# Patient Record
Sex: MALE | Race: BLACK OR AFRICAN AMERICAN | Employment: FULL TIME | URBAN - METROPOLITAN AREA
[De-identification: names, ages, dates, MRNs, and addresses within clinical notes are randomized per-mention and may not be internally consistent; named-entity substitution may affect disease eponyms.]

---

## 2017-07-17 ENCOUNTER — TELEPHONE (OUTPATIENT)
Dept: INTERNAL MEDICINE CLINIC | Facility: CLINIC | Age: 52
End: 2017-07-17

## 2017-07-17 NOTE — TELEPHONE ENCOUNTER
Cramping began at 1:30 Am  Sick to stomach with some diharrea  Trying to pump fluids afraid to eat  Is there something he can do? ?

## 2017-07-17 NOTE — TELEPHONE ENCOUNTER
Pt c/o n/v and loose stools since 1:30am this morning. Pt states that vomit seemed like acid yellow/ orange but no blood.  Pt has not been feeling well so he took a nap and now has a h/a   Pt had fish for dinner, then later that night had a few deli turkey

## 2017-07-17 NOTE — TELEPHONE ENCOUNTER
Called pt to advise info per SD that sxs should be self limiting and he is to push fluids, take BRAT diet since this could be viral in nature. Pt is aware that if worsens or unresolved, needs to be seen, since no abd pain, does not need ov.     Pt had no f

## 2017-10-12 ENCOUNTER — OFFICE VISIT (OUTPATIENT)
Dept: INTERNAL MEDICINE CLINIC | Facility: CLINIC | Age: 52
End: 2017-10-12

## 2017-10-12 VITALS
BODY MASS INDEX: 25.15 KG/M2 | TEMPERATURE: 98 F | HEART RATE: 86 BPM | SYSTOLIC BLOOD PRESSURE: 124 MMHG | RESPIRATION RATE: 18 BRPM | DIASTOLIC BLOOD PRESSURE: 76 MMHG | HEIGHT: 74 IN | WEIGHT: 196 LBS | OXYGEN SATURATION: 98 %

## 2017-10-12 DIAGNOSIS — Z12.5 SCREENING PSA (PROSTATE SPECIFIC ANTIGEN): ICD-10-CM

## 2017-10-12 DIAGNOSIS — Z13.220 SCREENING CHOLESTEROL LEVEL: ICD-10-CM

## 2017-10-12 DIAGNOSIS — G89.29 CHRONIC LEFT SHOULDER PAIN: Primary | ICD-10-CM

## 2017-10-12 DIAGNOSIS — M25.512 CHRONIC LEFT SHOULDER PAIN: Primary | ICD-10-CM

## 2017-10-12 DIAGNOSIS — Z13.29 SCREENING FOR THYROID DISORDER: ICD-10-CM

## 2017-10-12 DIAGNOSIS — Z00.00 BLOOD TESTS FOR ROUTINE GENERAL PHYSICAL EXAMINATION: ICD-10-CM

## 2017-10-12 PROCEDURE — 99213 OFFICE O/P EST LOW 20 MIN: CPT | Performed by: INTERNAL MEDICINE

## 2017-10-12 PROCEDURE — 90686 IIV4 VACC NO PRSV 0.5 ML IM: CPT | Performed by: INTERNAL MEDICINE

## 2017-10-12 PROCEDURE — 90471 IMMUNIZATION ADMIN: CPT | Performed by: INTERNAL MEDICINE

## 2017-10-12 RX ORDER — NAPROXEN 500 MG/1
500 TABLET ORAL 2 TIMES DAILY WITH MEALS
Qty: 20 TABLET | Refills: 0 | Status: SHIPPED | OUTPATIENT
Start: 2017-10-12 | End: 2018-01-18 | Stop reason: ALTCHOICE

## 2017-10-12 NOTE — PROGRESS NOTES
Patient presents with:  Shoulder Pain: on left side has alot of discomfort since 2 months. HPI:  Here for left shoulder pain, mild for about 2 mos, worse with lifiting weight pull ups. No swelling redness or warmth. advil helps, but he never takes it. tender, no masses, no organomegaly or hernias.   Musculoskeletal: No edema, left shoulder with FROM, mild anterior deltoid tenderness on exam    A/P:    Chronic left shoulder pain  (primary encounter diagnosis)  Screening cholesterol level  Screening psa (p

## 2017-10-18 ENCOUNTER — LAB ENCOUNTER (OUTPATIENT)
Dept: LAB | Age: 52
End: 2017-10-18
Attending: INTERNAL MEDICINE
Payer: COMMERCIAL

## 2017-10-18 ENCOUNTER — HOSPITAL ENCOUNTER (OUTPATIENT)
Dept: GENERAL RADIOLOGY | Age: 52
Discharge: HOME | End: 2017-10-18
Attending: INTERNAL MEDICINE
Payer: COMMERCIAL

## 2017-10-18 DIAGNOSIS — M25.512 CHRONIC LEFT SHOULDER PAIN: ICD-10-CM

## 2017-10-18 DIAGNOSIS — Z13.220 SCREENING CHOLESTEROL LEVEL: ICD-10-CM

## 2017-10-18 DIAGNOSIS — Z12.5 SCREENING PSA (PROSTATE SPECIFIC ANTIGEN): ICD-10-CM

## 2017-10-18 DIAGNOSIS — Z13.29 SCREENING FOR THYROID DISORDER: ICD-10-CM

## 2017-10-18 DIAGNOSIS — Z00.00 BLOOD TESTS FOR ROUTINE GENERAL PHYSICAL EXAMINATION: ICD-10-CM

## 2017-10-18 DIAGNOSIS — G89.29 CHRONIC LEFT SHOULDER PAIN: ICD-10-CM

## 2017-10-18 PROCEDURE — 85025 COMPLETE CBC W/AUTO DIFF WBC: CPT

## 2017-10-18 PROCEDURE — 73030 X-RAY EXAM OF SHOULDER: CPT | Performed by: INTERNAL MEDICINE

## 2017-10-18 PROCEDURE — 80053 COMPREHEN METABOLIC PANEL: CPT

## 2017-10-18 PROCEDURE — 36415 COLL VENOUS BLD VENIPUNCTURE: CPT

## 2017-10-18 PROCEDURE — 84443 ASSAY THYROID STIM HORMONE: CPT

## 2017-10-18 PROCEDURE — 80061 LIPID PANEL: CPT

## 2018-01-18 ENCOUNTER — OFFICE VISIT (OUTPATIENT)
Dept: INTERNAL MEDICINE CLINIC | Facility: CLINIC | Age: 53
End: 2018-01-18

## 2018-01-18 VITALS
TEMPERATURE: 98 F | HEIGHT: 74 IN | OXYGEN SATURATION: 98 % | WEIGHT: 195 LBS | BODY MASS INDEX: 25.03 KG/M2 | RESPIRATION RATE: 16 BRPM | SYSTOLIC BLOOD PRESSURE: 118 MMHG | HEART RATE: 77 BPM | DIASTOLIC BLOOD PRESSURE: 76 MMHG

## 2018-01-18 DIAGNOSIS — H69.81 DYSFUNCTION OF RIGHT EUSTACHIAN TUBE: ICD-10-CM

## 2018-01-18 DIAGNOSIS — H65.91 FLUID LEVEL BEHIND TYMPANIC MEMBRANE OF RIGHT EAR: Primary | ICD-10-CM

## 2018-01-18 PROCEDURE — 99213 OFFICE O/P EST LOW 20 MIN: CPT | Performed by: PHYSICIAN ASSISTANT

## 2018-01-18 RX ORDER — FLUTICASONE PROPIONATE 50 MCG
2 SPRAY, SUSPENSION (ML) NASAL DAILY
Qty: 1 BOTTLE | Refills: 0 | Status: SHIPPED | OUTPATIENT
Start: 2018-01-18 | End: 2018-10-03

## 2018-01-18 NOTE — PROGRESS NOTES
Patient presents with:  Ear Drainage: rt ear, for 3 days, tried mucinex,nyquil for congestion      HPI:  Pt presents with c/o drainage in his R ear for 3 days.   Pt had a cold recently (head congestion, some post nasal drainage) but has recovered well with rubs or gallops. Pulmonary/Chest: Effort normal and breath sounds normal. No respiratory distress. No wheezes, rhonchi or rales  Skin: Skin is warm and dry. No rash noted. No erythema. No pallor.        A/P:    Fluid level behind tympanic membrane of righ

## 2018-03-09 ENCOUNTER — OFFICE VISIT (OUTPATIENT)
Dept: INTERNAL MEDICINE CLINIC | Facility: CLINIC | Age: 53
End: 2018-03-09

## 2018-03-09 VITALS
HEIGHT: 74 IN | BODY MASS INDEX: 25.67 KG/M2 | HEART RATE: 68 BPM | WEIGHT: 200 LBS | DIASTOLIC BLOOD PRESSURE: 68 MMHG | SYSTOLIC BLOOD PRESSURE: 114 MMHG | RESPIRATION RATE: 16 BRPM | TEMPERATURE: 98 F

## 2018-03-09 DIAGNOSIS — R21 RASH: ICD-10-CM

## 2018-03-09 DIAGNOSIS — D72.829 LEUKOCYTOSIS, UNSPECIFIED TYPE: ICD-10-CM

## 2018-03-09 DIAGNOSIS — Z00.00 PE (PHYSICAL EXAM), ANNUAL: Primary | ICD-10-CM

## 2018-03-09 PROCEDURE — 99396 PREV VISIT EST AGE 40-64: CPT | Performed by: INTERNAL MEDICINE

## 2018-03-09 RX ORDER — CLOTRIMAZOLE AND BETAMETHASONE DIPROPIONATE 10; .64 MG/G; MG/G
1 CREAM TOPICAL 2 TIMES DAILY PRN
Qty: 30 G | Refills: 0 | Status: SHIPPED | OUTPATIENT
Start: 2018-03-09 | End: 2018-10-03

## 2018-03-09 NOTE — PROGRESS NOTES
Patient presents with:  Physical: Room 8-  No main concerns from patient - pt is not fasting last labs completed in October 2017       HPI:  Here for cpe. Notes rash on right hand for 2 weeks, not itchy or painful.  Not changing, hypepigmented and some sc 8/23/2013     Past Surgical History:  3/22/16: COLONOSCOPY      Comment: Dr. Consuelo Germain repeat 3 yrs  No date: DENTAL SURGERY PROCEDURE      Comment: tooth implant  Family History   Problem Relation Age of Onset   • hyperlipidemia[other] Jayme Crass Mother Pulmonary/Chest: Effort normal and breath sounds normal. No respiratory distress. No wheezes, rhonchi or rales  Abdominal: Soft. Bowel sounds are normal. Non tender, no masses, no organomegaly or hernias. Musculoskeletal: Normal range of motion.  No norm

## 2018-07-13 ENCOUNTER — OFFICE VISIT (OUTPATIENT)
Dept: INTERNAL MEDICINE CLINIC | Facility: CLINIC | Age: 53
End: 2018-07-13

## 2018-07-13 VITALS
TEMPERATURE: 98 F | BODY MASS INDEX: 25.67 KG/M2 | HEIGHT: 74 IN | SYSTOLIC BLOOD PRESSURE: 120 MMHG | DIASTOLIC BLOOD PRESSURE: 76 MMHG | WEIGHT: 200 LBS | RESPIRATION RATE: 17 BRPM | HEART RATE: 72 BPM

## 2018-07-13 DIAGNOSIS — R36.9 PENILE DISCHARGE: ICD-10-CM

## 2018-07-13 DIAGNOSIS — R35.0 URINE FREQUENCY: Primary | ICD-10-CM

## 2018-07-13 LAB
APPEARANCE: CLEAR
MULTISTIX LOT#: NORMAL NUMERIC
PH, URINE: 7 (ref 4.5–8)
SPECIFIC GRAVITY: 1.01 (ref 1–1.03)
URINE-COLOR: YELLOW
UROBILINOGEN,SEMI-QN: 0.2 MG/DL (ref 0–1.9)

## 2018-07-13 PROCEDURE — 81003 URINALYSIS AUTO W/O SCOPE: CPT | Performed by: NURSE PRACTITIONER

## 2018-07-13 PROCEDURE — 99213 OFFICE O/P EST LOW 20 MIN: CPT | Performed by: NURSE PRACTITIONER

## 2018-07-13 PROCEDURE — 87086 URINE CULTURE/COLONY COUNT: CPT | Performed by: NURSE PRACTITIONER

## 2018-07-13 RX ORDER — FAMCICLOVIR 500 MG/1
1500 TABLET, FILM COATED ORAL ONCE
Qty: 3 TABLET | Refills: 0 | Status: SHIPPED | OUTPATIENT
Start: 2018-07-13 | End: 2018-09-13

## 2018-07-13 NOTE — PROGRESS NOTES
Patient presents with:  Discharge: Room 7 - Every so many years in the summertime has some discharge while coming back from a trip noticed shorts were \"wet\" turned out to be dishcarge (this past wednesday) has subsided since thursday - Frequent urinati Exam  /76 (BP Location: Right arm, Patient Position: Sitting, Cuff Size: adult)   Pulse 72   Temp 98 °F (36.7 °C) (Oral)   Resp 17   Ht 74\"   Wt 200 lb   BMI 25.68 kg/m²   Constitutional: well developed, well nourished,in no apparent distress  HEENT

## 2018-08-15 ENCOUNTER — LAB ENCOUNTER (OUTPATIENT)
Dept: LAB | Age: 53
End: 2018-08-15
Attending: INTERNAL MEDICINE
Payer: COMMERCIAL

## 2018-08-15 DIAGNOSIS — D72.829 LEUKOCYTOSIS, UNSPECIFIED TYPE: ICD-10-CM

## 2018-08-15 LAB
BASOPHILS # BLD AUTO: 0.03 X10(3) UL (ref 0–0.1)
BASOPHILS NFR BLD AUTO: 0.7 %
EOSINOPHIL # BLD AUTO: 0.06 X10(3) UL (ref 0–0.3)
EOSINOPHIL NFR BLD AUTO: 1.4 %
ERYTHROCYTE [DISTWIDTH] IN BLOOD BY AUTOMATED COUNT: 12.7 % (ref 11.5–16)
HCT VFR BLD AUTO: 48.3 % (ref 37–53)
HGB BLD-MCNC: 15.5 G/DL (ref 13–17)
IMMATURE GRANULOCYTE COUNT: 0.01 X10(3) UL (ref 0–1)
IMMATURE GRANULOCYTE RATIO %: 0.2 %
LYMPHOCYTES # BLD AUTO: 1.54 X10(3) UL (ref 0.9–4)
LYMPHOCYTES NFR BLD AUTO: 34.8 %
MCH RBC QN AUTO: 26.5 PG (ref 27–33.2)
MCHC RBC AUTO-ENTMCNC: 32.1 G/DL (ref 31–37)
MCV RBC AUTO: 82.7 FL (ref 80–99)
MONOCYTES # BLD AUTO: 0.38 X10(3) UL (ref 0.1–1)
MONOCYTES NFR BLD AUTO: 8.6 %
NEUTROPHIL ABS PRELIM: 2.41 X10 (3) UL (ref 1.3–6.7)
NEUTROPHILS # BLD AUTO: 2.41 X10(3) UL (ref 1.3–6.7)
NEUTROPHILS NFR BLD AUTO: 54.3 %
PLATELET # BLD AUTO: 259 10(3)UL (ref 150–450)
RBC # BLD AUTO: 5.84 X10(6)UL (ref 4.3–5.7)
RED CELL DISTRIBUTION WIDTH-SD: 38.2 FL (ref 35.1–46.3)
WBC # BLD AUTO: 4.4 X10(3) UL (ref 4–13)

## 2018-08-15 PROCEDURE — 36415 COLL VENOUS BLD VENIPUNCTURE: CPT | Performed by: INTERNAL MEDICINE

## 2018-08-15 PROCEDURE — 85025 COMPLETE CBC W/AUTO DIFF WBC: CPT | Performed by: INTERNAL MEDICINE

## 2018-09-13 RX ORDER — FAMCICLOVIR 500 MG/1
1500 TABLET, FILM COATED ORAL ONCE
Qty: 6 TABLET | Refills: 0 | Status: SHIPPED | OUTPATIENT
Start: 2018-09-13 | End: 2018-09-13

## 2018-09-13 NOTE — TELEPHONE ENCOUNTER
Re Famciclovir 500 MG Oral Tab. . Pt needs refill. States it was sent electronically and that it would not go through.

## 2018-10-03 ENCOUNTER — OFFICE VISIT (OUTPATIENT)
Dept: INTERNAL MEDICINE CLINIC | Facility: CLINIC | Age: 53
End: 2018-10-03
Payer: COMMERCIAL

## 2018-10-03 VITALS
HEART RATE: 76 BPM | DIASTOLIC BLOOD PRESSURE: 64 MMHG | RESPIRATION RATE: 16 BRPM | TEMPERATURE: 98 F | SYSTOLIC BLOOD PRESSURE: 122 MMHG | HEIGHT: 74 IN | BODY MASS INDEX: 25.8 KG/M2 | WEIGHT: 201 LBS

## 2018-10-03 DIAGNOSIS — Z23 NEEDS FLU SHOT: ICD-10-CM

## 2018-10-03 DIAGNOSIS — J02.9 SORE THROAT: Primary | ICD-10-CM

## 2018-10-03 DIAGNOSIS — J30.2 SEASONAL ALLERGIES: ICD-10-CM

## 2018-10-03 PROCEDURE — 99213 OFFICE O/P EST LOW 20 MIN: CPT | Performed by: PHYSICIAN ASSISTANT

## 2018-10-03 PROCEDURE — 90471 IMMUNIZATION ADMIN: CPT | Performed by: PHYSICIAN ASSISTANT

## 2018-10-03 PROCEDURE — 90686 IIV4 VACC NO PRSV 0.5 ML IM: CPT | Performed by: PHYSICIAN ASSISTANT

## 2018-10-03 RX ORDER — FAMCICLOVIR 500 MG/1
1500 TABLET, FILM COATED ORAL ONCE
Qty: 3 TABLET | Refills: 2 | Status: SHIPPED | OUTPATIENT
Start: 2018-10-03 | End: 2020-01-28

## 2018-10-03 RX ORDER — ALCLOMETASONE DIPROPIONATE 0.5 MG/G
OINTMENT TOPICAL
COMMUNITY
Start: 2018-06-18 | End: 2019-11-18 | Stop reason: ALTCHOICE

## 2018-10-03 RX ORDER — FLUTICASONE PROPIONATE 50 MCG
2 SPRAY, SUSPENSION (ML) NASAL DAILY
Qty: 1 BOTTLE | Refills: 3 | Status: SHIPPED | OUTPATIENT
Start: 2018-10-03 | End: 2019-11-12

## 2018-10-03 NOTE — PROGRESS NOTES
Patient presents with:  Sore Throat: cn room 6: sore throat when sneezing for the last couple weeks . HPI:  Pt presents with c/o L sided sore throat with sneezing over the last couple of weeks.   Admits it did start to improve over the last couple of Exam  /64 (BP Location: Right arm, Patient Position: Sitting, Cuff Size: adult)   Pulse 76   Temp 98.3 °F (36.8 °C) (Oral)   Resp 16   Ht 74\"   Wt 201 lb   BMI 25.81 kg/m²   Constitutional:  No distress. HEENT:  Normocephalic and atraumatic.  Joyce Jing

## 2018-10-08 ENCOUNTER — TELEPHONE (OUTPATIENT)
Dept: INTERNAL MEDICINE CLINIC | Facility: CLINIC | Age: 53
End: 2018-10-08

## 2018-10-08 NOTE — TELEPHONE ENCOUNTER
Pt was seen on 10/3/18 by CB for a sore throat is still pretty painful. When he sneezes and cough and turned his head (moves his neck) it hurts.  Please advise

## 2018-10-08 NOTE — TELEPHONE ENCOUNTER
Called pt to inform sore throat pain has increased when sneezing, cough, and with head movements. Claritin and Flonase- minimal improvements. Per CB, F/U if not resolving- pt does not feel necessary to RTC at this time.  Advised may try in the mean trial lo

## 2019-02-12 ENCOUNTER — TELEPHONE (OUTPATIENT)
Dept: INTERNAL MEDICINE CLINIC | Facility: CLINIC | Age: 54
End: 2019-02-12

## 2019-02-12 DIAGNOSIS — Z12.5 SPECIAL SCREENING FOR MALIGNANT NEOPLASM OF PROSTATE: ICD-10-CM

## 2019-02-12 DIAGNOSIS — Z00.00 ROUTINE GENERAL MEDICAL EXAMINATION AT A HEALTH CARE FACILITY: Primary | ICD-10-CM

## 2019-02-12 NOTE — TELEPHONE ENCOUNTER
CPE   Future Appointments   Date Time Provider Whitley Perez       [facility]   3/18/2019  8:00 AM Brandon Veras MD [facility]     Orders to [address] must fast no call back required

## 2019-03-05 ENCOUNTER — OFFICE VISIT (OUTPATIENT)
Dept: INTERNAL MEDICINE CLINIC | Facility: CLINIC | Age: 54
End: 2019-03-05
Payer: COMMERCIAL

## 2019-03-05 VITALS
DIASTOLIC BLOOD PRESSURE: 60 MMHG | HEIGHT: 74 IN | BODY MASS INDEX: 25.8 KG/M2 | SYSTOLIC BLOOD PRESSURE: 108 MMHG | WEIGHT: 201 LBS | TEMPERATURE: 98 F | HEART RATE: 68 BPM

## 2019-03-05 DIAGNOSIS — G89.29 CHRONIC PAIN OF BOTH KNEES: ICD-10-CM

## 2019-03-05 DIAGNOSIS — M25.562 CHRONIC PAIN OF BOTH KNEES: ICD-10-CM

## 2019-03-05 DIAGNOSIS — M25.561 CHRONIC PAIN OF BOTH KNEES: ICD-10-CM

## 2019-03-05 DIAGNOSIS — R21 RASH: Primary | ICD-10-CM

## 2019-03-05 PROCEDURE — 99213 OFFICE O/P EST LOW 20 MIN: CPT | Performed by: INTERNAL MEDICINE

## 2019-03-05 RX ORDER — ACYCLOVIR 50 MG/G
1 OINTMENT TOPICAL
Qty: 15 G | Refills: 0 | Status: SHIPPED | OUTPATIENT
Start: 2019-03-05 | End: 2020-10-09

## 2019-03-05 RX ORDER — CLOTRIMAZOLE AND BETAMETHASONE DIPROPIONATE 10; .64 MG/G; MG/G
1 CREAM TOPICAL 2 TIMES DAILY PRN
Qty: 15 G | Refills: 0 | Status: SHIPPED | OUTPATIENT
Start: 2019-03-05 | End: 2019-11-18 | Stop reason: ALTCHOICE

## 2019-03-05 RX ORDER — ACYCLOVIR 50 MG/G
1 OINTMENT TOPICAL
COMMUNITY
End: 2019-03-05

## 2019-03-05 NOTE — PROGRESS NOTES
Patient presents with:  Knee Pain: LG. Room 9. Bilateral knee pain for about 1-2 months.        HPI:  Here for bilateal knee pain for 1-3 mos, cinse starring to play tennis again, some stiffness after sitting, denies instability, riht worse than left, moder intact distal pulses. No murmur, rubs or gallops. Pulmonary/Chest: Effort normal and breath sounds normal. No respiratory distress. Abdominal: Soft. Bowel sounds are normal. Non tender, no masses, no organomegaly or hernias.   Musculoskeletal: No edema;

## 2019-03-12 ENCOUNTER — LAB ENCOUNTER (OUTPATIENT)
Dept: LAB | Age: 54
End: 2019-03-12
Attending: INTERNAL MEDICINE
Payer: COMMERCIAL

## 2019-03-12 DIAGNOSIS — Z12.5 SPECIAL SCREENING FOR MALIGNANT NEOPLASM OF PROSTATE: ICD-10-CM

## 2019-03-12 DIAGNOSIS — Z00.00 ROUTINE GENERAL MEDICAL EXAMINATION AT A HEALTH CARE FACILITY: ICD-10-CM

## 2019-03-12 LAB
ALBUMIN SERPL-MCNC: 4.1 G/DL (ref 3.4–5)
ALBUMIN/GLOB SERPL: 1.2 {RATIO} (ref 1–2)
ALP LIVER SERPL-CCNC: 66 U/L (ref 45–117)
ALT SERPL-CCNC: 26 U/L (ref 16–61)
ANION GAP SERPL CALC-SCNC: 5 MMOL/L (ref 0–18)
AST SERPL-CCNC: 28 U/L (ref 15–37)
BASOPHILS # BLD AUTO: 0.04 X10(3) UL (ref 0–0.2)
BASOPHILS NFR BLD AUTO: 0.8 %
BILIRUB SERPL-MCNC: 0.6 MG/DL (ref 0.1–2)
BUN BLD-MCNC: 19 MG/DL (ref 7–18)
BUN/CREAT SERPL: 15.1 (ref 10–20)
CALCIUM BLD-MCNC: 9.1 MG/DL (ref 8.5–10.1)
CHLORIDE SERPL-SCNC: 107 MMOL/L (ref 98–107)
CHOLEST SMN-MCNC: 192 MG/DL (ref ?–200)
CO2 SERPL-SCNC: 28 MMOL/L (ref 21–32)
COMPLEXED PSA SERPL-MCNC: 5.36 NG/ML (ref ?–4)
CREAT BLD-MCNC: 1.26 MG/DL (ref 0.7–1.3)
DEPRECATED RDW RBC AUTO: 37.7 FL (ref 35.1–46.3)
EOSINOPHIL # BLD AUTO: 0.08 X10(3) UL (ref 0–0.7)
EOSINOPHIL NFR BLD AUTO: 1.7 %
ERYTHROCYTE [DISTWIDTH] IN BLOOD BY AUTOMATED COUNT: 12.8 % (ref 11–15)
GLOBULIN PLAS-MCNC: 3.3 G/DL (ref 2.8–4.4)
GLUCOSE BLD-MCNC: 96 MG/DL (ref 70–99)
HCT VFR BLD AUTO: 46.4 % (ref 39–53)
HDLC SERPL-MCNC: 53 MG/DL (ref 40–59)
HGB BLD-MCNC: 15.2 G/DL (ref 13–17.5)
IMM GRANULOCYTES # BLD AUTO: 0.01 X10(3) UL (ref 0–1)
IMM GRANULOCYTES NFR BLD: 0.2 %
LDLC SERPL CALC-MCNC: 121 MG/DL (ref ?–100)
LYMPHOCYTES # BLD AUTO: 1.76 X10(3) UL (ref 1–4)
LYMPHOCYTES NFR BLD AUTO: 36.4 %
M PROTEIN MFR SERPL ELPH: 7.4 G/DL (ref 6.4–8.2)
MCH RBC QN AUTO: 26.8 PG (ref 26–34)
MCHC RBC AUTO-ENTMCNC: 32.8 G/DL (ref 31–37)
MCV RBC AUTO: 81.8 FL (ref 80–100)
MONOCYTES # BLD AUTO: 0.39 X10(3) UL (ref 0.1–1)
MONOCYTES NFR BLD AUTO: 8.1 %
NEUTROPHILS # BLD AUTO: 2.55 X10 (3) UL (ref 1.5–7.7)
NEUTROPHILS # BLD AUTO: 2.55 X10(3) UL (ref 1.5–7.7)
NEUTROPHILS NFR BLD AUTO: 52.8 %
NONHDLC SERPL-MCNC: 139 MG/DL (ref ?–130)
OSMOLALITY SERPL CALC.SUM OF ELEC: 292 MOSM/KG (ref 275–295)
PLATELET # BLD AUTO: 252 10(3)UL (ref 150–450)
POTASSIUM SERPL-SCNC: 4.2 MMOL/L (ref 3.5–5.1)
RBC # BLD AUTO: 5.67 X10(6)UL (ref 4.3–5.7)
SODIUM SERPL-SCNC: 140 MMOL/L (ref 136–145)
TRIGL SERPL-MCNC: 88 MG/DL (ref 30–149)
TSI SER-ACNC: 2.17 MIU/ML (ref 0.36–3.74)
VLDLC SERPL CALC-MCNC: 18 MG/DL (ref 0–30)
WBC # BLD AUTO: 4.8 X10(3) UL (ref 4–11)

## 2019-03-12 PROCEDURE — 85025 COMPLETE CBC W/AUTO DIFF WBC: CPT

## 2019-03-12 PROCEDURE — 80061 LIPID PANEL: CPT

## 2019-03-12 PROCEDURE — 36415 COLL VENOUS BLD VENIPUNCTURE: CPT

## 2019-03-12 PROCEDURE — 80053 COMPREHEN METABOLIC PANEL: CPT

## 2019-03-12 PROCEDURE — 84443 ASSAY THYROID STIM HORMONE: CPT

## 2019-03-15 ENCOUNTER — HOSPITAL ENCOUNTER (OUTPATIENT)
Dept: GENERAL RADIOLOGY | Age: 54
Discharge: HOME | End: 2019-03-15
Attending: INTERNAL MEDICINE
Payer: COMMERCIAL

## 2019-03-15 DIAGNOSIS — M25.562 CHRONIC PAIN OF BOTH KNEES: ICD-10-CM

## 2019-03-15 DIAGNOSIS — M25.561 CHRONIC PAIN OF BOTH KNEES: ICD-10-CM

## 2019-03-15 DIAGNOSIS — G89.29 CHRONIC PAIN OF BOTH KNEES: ICD-10-CM

## 2019-03-15 PROCEDURE — 73560 X-RAY EXAM OF KNEE 1 OR 2: CPT | Performed by: INTERNAL MEDICINE

## 2019-03-18 ENCOUNTER — OFFICE VISIT (OUTPATIENT)
Dept: INTERNAL MEDICINE CLINIC | Facility: CLINIC | Age: 54
End: 2019-03-18
Payer: COMMERCIAL

## 2019-03-18 VITALS
DIASTOLIC BLOOD PRESSURE: 80 MMHG | WEIGHT: 205.81 LBS | HEART RATE: 84 BPM | HEIGHT: 74 IN | SYSTOLIC BLOOD PRESSURE: 130 MMHG | RESPIRATION RATE: 16 BRPM | BODY MASS INDEX: 26.41 KG/M2

## 2019-03-18 DIAGNOSIS — N40.0 BENIGN PROSTATIC HYPERPLASIA WITHOUT LOWER URINARY TRACT SYMPTOMS: ICD-10-CM

## 2019-03-18 DIAGNOSIS — Z12.11 SCREEN FOR COLON CANCER: ICD-10-CM

## 2019-03-18 DIAGNOSIS — R97.20 ELEVATED PSA: ICD-10-CM

## 2019-03-18 DIAGNOSIS — Z00.00 PE (PHYSICAL EXAM), ANNUAL: Primary | ICD-10-CM

## 2019-03-18 PROBLEM — M17.10 ARTHRITIS OF KNEE: Status: ACTIVE | Noted: 2019-03-18

## 2019-03-18 PROCEDURE — 99396 PREV VISIT EST AGE 40-64: CPT | Performed by: INTERNAL MEDICINE

## 2019-03-18 NOTE — PROGRESS NOTES
Patient presents with:  Physical: RB exam rm-9. Patient is here for physical exam.       HPI:  Here for cpe. Has psa eleation, deneis bph symptoms. Notes some arthritis pain of knees bilaterally seen on xray as well. It is mild.      Review of Systems   Con (recurrent)    • URI (upper respiratory infection) 8/23/2013     Past Surgical History:   Procedure Laterality Date   • COLONOSCOPY  3/22/16    Dr. Bean Maya repeat 3 yrs   • DENTAL SURGERY PROCEDURE      tooth implant     Family History   Problem Relati Eyes: Conjunctivae and EOM are normal. PERRLA. No scleral icterus. Neck: Normal range of motion. Neck supple. Normal carotid pulses and no JVD present. No edema present. No mass and no thyromegaly present.    Cardiovascular: Normal rate, regular rhythm

## 2019-04-08 ENCOUNTER — APPOINTMENT (OUTPATIENT)
Dept: LAB | Age: 54
End: 2019-04-08
Attending: INTERNAL MEDICINE
Payer: COMMERCIAL

## 2019-04-08 DIAGNOSIS — R97.20 ELEVATED PSA: ICD-10-CM

## 2019-04-08 PROCEDURE — 36415 COLL VENOUS BLD VENIPUNCTURE: CPT

## 2019-04-08 PROCEDURE — 84153 ASSAY OF PSA TOTAL: CPT

## 2019-05-02 ENCOUNTER — OFFICE VISIT (OUTPATIENT)
Dept: INTERNAL MEDICINE CLINIC | Facility: CLINIC | Age: 54
End: 2019-05-02
Payer: COMMERCIAL

## 2019-05-02 VITALS
HEART RATE: 72 BPM | SYSTOLIC BLOOD PRESSURE: 124 MMHG | HEIGHT: 74 IN | RESPIRATION RATE: 16 BRPM | WEIGHT: 205 LBS | BODY MASS INDEX: 26.31 KG/M2 | DIASTOLIC BLOOD PRESSURE: 80 MMHG

## 2019-05-02 DIAGNOSIS — S99.911A INJURY OF RIGHT ANKLE, INITIAL ENCOUNTER: Primary | ICD-10-CM

## 2019-05-02 DIAGNOSIS — R97.20 ELEVATED PSA: ICD-10-CM

## 2019-05-02 DIAGNOSIS — N40.0 BENIGN PROSTATIC HYPERPLASIA WITHOUT LOWER URINARY TRACT SYMPTOMS: ICD-10-CM

## 2019-05-02 PROCEDURE — 99214 OFFICE O/P EST MOD 30 MIN: CPT | Performed by: INTERNAL MEDICINE

## 2019-05-02 NOTE — PROGRESS NOTES
Armond Castellanos  9/17/1965    Patient presents with: Ankle Pain: Pt has R ankle swelling. Pt states 5-6 weeks ago he injured is R ankle. LB-rm 7      SUBJECTIVE   Armond Castellanos is a 48year old male who presents with right ankle pain.     The patient notes a • Personal history of pneumonia (recurrent)    • URI (upper respiratory infection) 8/23/2013      Patient Active Problem List:     Herpes simplex without mention of complication     Unspecified vitamin D deficiency     Laboratory examination ordered as p right ankle pain.     Right ankle sprain:  Resolved  Asymptomatic mild lateral swelling as compared with left ankle; unsure of chronicity, with no other abnormalities on examination  Continue weight bearing, and routine and recreational physical activity  U

## 2019-05-08 ENCOUNTER — TELEPHONE (OUTPATIENT)
Dept: INTERNAL MEDICINE CLINIC | Facility: CLINIC | Age: 54
End: 2019-05-08

## 2019-05-08 NOTE — TELEPHONE ENCOUNTER
Pt calling to say he got a message via My Chart saying that there was \"bloodwork and stool tests\" that needed to be completed. Pt unsure what they are for. OK to answer through My Chart message.

## 2019-05-08 NOTE — TELEPHONE ENCOUNTER
Last labs 3/12/19  Any additional labs needed? Due for FIT colorectal screening- will mail kit to pt's home address. Please advise. Thank you.

## 2019-05-09 ENCOUNTER — OFFICE VISIT (OUTPATIENT)
Dept: SURGERY | Facility: CLINIC | Age: 54
End: 2019-05-09
Payer: COMMERCIAL

## 2019-05-09 VITALS
WEIGHT: 197 LBS | HEART RATE: 76 BPM | DIASTOLIC BLOOD PRESSURE: 80 MMHG | HEIGHT: 74 IN | TEMPERATURE: 98 F | SYSTOLIC BLOOD PRESSURE: 130 MMHG | BODY MASS INDEX: 25.28 KG/M2

## 2019-05-09 DIAGNOSIS — Z12.5 SPECIAL SCREENING FOR MALIGNANT NEOPLASM OF PROSTATE: ICD-10-CM

## 2019-05-09 DIAGNOSIS — N40.0 BENIGN PROSTATIC HYPERPLASIA WITHOUT LOWER URINARY TRACT SYMPTOMS: Primary | ICD-10-CM

## 2019-05-09 DIAGNOSIS — R97.20 ELEVATED PSA: ICD-10-CM

## 2019-05-09 PROCEDURE — 99244 OFF/OP CNSLTJ NEW/EST MOD 40: CPT | Performed by: UROLOGY

## 2019-05-09 NOTE — PROGRESS NOTES
[facility]  Initial Office Consultation    HPI:   Sherrie Rosales is a 48year old male here today for consultation at the request of, and a copy of this note will be sent to, Mark Duggan MD.    1. Elevated Screening PSA  Patient is - Other (hyperlipidemia) Mother    • Hypertension Father    • Other (HTN) Father      Allergies: Ciprofloxacin Hcl    REVIEW OF SYSTEMS:  Review of Systems   Constitutional: Negative for appetite change, chills, fever and unexpected weight change.    HENT: Ne 2.47    3/12/2015  2.52    9/6/2013 1.7     3/12/2012 1.0     1/25/2011 1.2       IMAGING:  No results found.     IMPRESSION:  51-year-old -American male with an elevated screening PSA level in 03/2019 which went back down to normal upon repeat in 04

## 2019-10-31 ENCOUNTER — MED REC SCAN ONLY (OUTPATIENT)
Dept: INTERNAL MEDICINE CLINIC | Facility: CLINIC | Age: 54
End: 2019-10-31

## 2019-10-31 ENCOUNTER — IMMUNIZATION (OUTPATIENT)
Dept: INTERNAL MEDICINE CLINIC | Facility: CLINIC | Age: 54
End: 2019-10-31
Payer: COMMERCIAL

## 2019-10-31 DIAGNOSIS — Z23 NEED FOR VACCINATION: ICD-10-CM

## 2019-10-31 PROCEDURE — 90686 IIV4 VACC NO PRSV 0.5 ML IM: CPT | Performed by: INTERNAL MEDICINE

## 2019-10-31 PROCEDURE — 90471 IMMUNIZATION ADMIN: CPT | Performed by: INTERNAL MEDICINE

## 2019-11-12 RX ORDER — FLUTICASONE PROPIONATE 50 MCG
SPRAY, SUSPENSION (ML) NASAL
Qty: 16 G | Refills: 2 | Status: SHIPPED | OUTPATIENT
Start: 2019-11-12 | End: 2021-04-29

## 2019-11-18 ENCOUNTER — OFFICE VISIT (OUTPATIENT)
Dept: INTERNAL MEDICINE CLINIC | Facility: CLINIC | Age: 54
End: 2019-11-18
Payer: COMMERCIAL

## 2019-11-18 VITALS
HEIGHT: 74 IN | DIASTOLIC BLOOD PRESSURE: 82 MMHG | HEART RATE: 72 BPM | WEIGHT: 202 LBS | SYSTOLIC BLOOD PRESSURE: 118 MMHG | RESPIRATION RATE: 16 BRPM | BODY MASS INDEX: 25.93 KG/M2 | TEMPERATURE: 98 F

## 2019-11-18 DIAGNOSIS — M62.838 CERVICAL PARASPINAL MUSCLE SPASM: Primary | ICD-10-CM

## 2019-11-18 PROCEDURE — 99213 OFFICE O/P EST LOW 20 MIN: CPT | Performed by: INTERNAL MEDICINE

## 2019-11-18 RX ORDER — CYCLOBENZAPRINE HCL 10 MG
10 TABLET ORAL NIGHTLY PRN
Qty: 14 TABLET | Refills: 1 | Status: SHIPPED | OUTPATIENT
Start: 2019-11-18 | End: 2019-12-02

## 2019-11-18 NOTE — PROGRESS NOTES
Herminio Vences  9/17/1965    Patient presents with:  Neck Pain: x 1 wk, L side, \"aching pain\", trouble turning head, using OTC advil w/o relief      SUBJECTIVE   Herminio Vences is a 47year old male who presents with neck pain and stiffness.     The patient routine general medical examination     Special screening for malignant neoplasm of prostate     Seasonal allergies     Pain in joint, lower leg     URI (upper respiratory infection)     Hyperplastic colonic polyp     Arthritis of knee     Elevated PSA spasm:  Advised stretching exercises and application of heat  Oral muscle relaxant therapy prescribed  If symptoms persist will consider imaging vs physical therapy. The patient indicates understanding of these issues and agrees to the plan.     TODAY'S

## 2019-11-27 PROBLEM — Z86.010 PERSONAL HISTORY OF COLONIC POLYPS: Status: ACTIVE | Noted: 2019-11-27

## 2019-11-27 PROBLEM — Z86.0100 PERSONAL HISTORY OF COLONIC POLYPS: Status: ACTIVE | Noted: 2019-11-27

## 2020-01-28 RX ORDER — FAMCICLOVIR 500 MG/1
1500 TABLET, FILM COATED ORAL ONCE
Qty: 3 TABLET | Refills: 0 | Status: SHIPPED | OUTPATIENT
Start: 2020-01-28 | End: 2020-03-06

## 2020-01-28 NOTE — TELEPHONE ENCOUNTER
Prescription Refill Request - Patient advised can take 48-72 hours.     Name of Medication (strength, dose, qty requested:   Famciclovir 500 MG Oral Tab () 6 tablet 0 2018   Sig:   Take 3 tablets (1,500 mg total) by mouth one time for

## 2020-01-28 NOTE — TELEPHONE ENCOUNTER
Please see previous TE      LOV:11/18/19 AD  FOV:none on file   LAST RX:10/3/18 500 mg take 3 tabs by mouth for 1 time dose 3 tabs 2 refills   LAST LABS:4/8/19 psa  PER PROTOCOL: to provider

## 2020-03-06 RX ORDER — FAMCICLOVIR 500 MG/1
1500 TABLET, FILM COATED ORAL ONCE
Qty: 3 TABLET | Refills: 0 | Status: SHIPPED | OUTPATIENT
Start: 2020-03-06 | End: 2020-06-02

## 2020-06-02 RX ORDER — FAMCICLOVIR 500 MG/1
1500 TABLET, FILM COATED ORAL ONCE
Qty: 3 TABLET | Refills: 0 | Status: SHIPPED | OUTPATIENT
Start: 2020-06-02 | End: 2020-06-02

## 2020-06-02 NOTE — TELEPHONE ENCOUNTER
Pt would like new rx for FAMCICLOVIR 500 MG Oral Tab and acyclovir 5 % External Ointment sent [facility] for his cold sores he is getting

## 2020-06-02 NOTE — TELEPHONE ENCOUNTER
Pt requesting Famciclovir 500mg    LOV:11/18/19-Cervical Paraspinal Muscle Spasm with AD  Last CPE:3/18/19 CPE with AS  Last refill: Famciclovir 500mg-3/6/20  Quantity:3 tablets, 0 refills  Last labs that are related: cbc 3/12/19  Future appointment: None

## 2020-10-09 ENCOUNTER — OFFICE VISIT (OUTPATIENT)
Dept: INTERNAL MEDICINE CLINIC | Facility: CLINIC | Age: 55
End: 2020-10-09
Payer: COMMERCIAL

## 2020-10-09 VITALS
HEIGHT: 74 IN | RESPIRATION RATE: 18 BRPM | BODY MASS INDEX: 25.95 KG/M2 | HEART RATE: 68 BPM | TEMPERATURE: 97 F | SYSTOLIC BLOOD PRESSURE: 124 MMHG | WEIGHT: 202.19 LBS | DIASTOLIC BLOOD PRESSURE: 82 MMHG

## 2020-10-09 DIAGNOSIS — E55.9 VITAMIN D DEFICIENCY: ICD-10-CM

## 2020-10-09 DIAGNOSIS — Z12.5 SCREENING PSA (PROSTATE SPECIFIC ANTIGEN): ICD-10-CM

## 2020-10-09 DIAGNOSIS — Z00.00 PE (PHYSICAL EXAM), ANNUAL: Primary | ICD-10-CM

## 2020-10-09 DIAGNOSIS — Z00.00 ROUTINE GENERAL MEDICAL EXAMINATION AT A HEALTH CARE FACILITY: ICD-10-CM

## 2020-10-09 DIAGNOSIS — N40.0 BENIGN PROSTATIC HYPERPLASIA WITHOUT LOWER URINARY TRACT SYMPTOMS: ICD-10-CM

## 2020-10-09 DIAGNOSIS — Z00.00 LABORATORY EXAMINATION ORDERED AS PART OF A ROUTINE GENERAL MEDICAL EXAMINATION: ICD-10-CM

## 2020-10-09 DIAGNOSIS — Z13.220 SCREENING CHOLESTEROL LEVEL: ICD-10-CM

## 2020-10-09 PROCEDURE — 3079F DIAST BP 80-89 MM HG: CPT | Performed by: INTERNAL MEDICINE

## 2020-10-09 PROCEDURE — 90471 IMMUNIZATION ADMIN: CPT | Performed by: INTERNAL MEDICINE

## 2020-10-09 PROCEDURE — 90686 IIV4 VACC NO PRSV 0.5 ML IM: CPT | Performed by: INTERNAL MEDICINE

## 2020-10-09 PROCEDURE — 3074F SYST BP LT 130 MM HG: CPT | Performed by: INTERNAL MEDICINE

## 2020-10-09 PROCEDURE — 3008F BODY MASS INDEX DOCD: CPT | Performed by: INTERNAL MEDICINE

## 2020-10-09 PROCEDURE — 99396 PREV VISIT EST AGE 40-64: CPT | Performed by: INTERNAL MEDICINE

## 2020-10-09 RX ORDER — ACYCLOVIR 50 MG/G
1 OINTMENT TOPICAL
Qty: 15 G | Refills: 3 | Status: SHIPPED | OUTPATIENT
Start: 2020-10-09

## 2020-10-09 RX ORDER — LORATADINE 10 MG/1
10 TABLET ORAL DAILY
COMMUNITY

## 2020-10-09 NOTE — PROGRESS NOTES
Patient presents with:  Wellness Visit: MR rm 8 annual pe   Vaccinations: wants flu       HPI:  Here for cpe. Review of Systems   Constitutional: Negative for fever, chills and fatigue. No distress.   HENT: Negative for hearing loss, congestion, sore th infection) 8/23/2013     Past Surgical History:   Procedure Laterality Date   • COLONOSCOPY  3/22/16    Dr. Anne Canas repeat 3 yrs   • DENTAL SURGERY PROCEDURE      tooth implant     Family History   Problem Relation Age of Onset   • Hypertension Mother Left arm, Patient Position: Sitting, Cuff Size: adult)   Pulse 68   Temp 97.2 °F (36.2 °C) (Temporal)   Resp 18   Ht 74\"   Wt 202 lb 3.2 oz (91.7 kg)   BMI 25.96 kg/m²   Constitutional: Oriented to person, place, and time. No distress.    HEENT:  Normoceph this Visit:  Requested Prescriptions     Signed Prescriptions Disp Refills   • acyclovir 5 % External Ointment 15 g 3     Sig: Apply 1 Application topically every 3 (three) hours.        Imaging & Consults:  FLULAVAL INFLUENZA VACCINE QUAD PRESERVATIVE FREE

## 2020-10-12 ENCOUNTER — LAB ENCOUNTER (OUTPATIENT)
Dept: LAB | Age: 55
End: 2020-10-12
Attending: INTERNAL MEDICINE
Payer: COMMERCIAL

## 2020-10-12 DIAGNOSIS — Z12.5 SCREENING PSA (PROSTATE SPECIFIC ANTIGEN): ICD-10-CM

## 2020-10-12 DIAGNOSIS — Z13.220 SCREENING CHOLESTEROL LEVEL: ICD-10-CM

## 2020-10-12 DIAGNOSIS — Z00.00 LABORATORY EXAMINATION ORDERED AS PART OF A ROUTINE GENERAL MEDICAL EXAMINATION: ICD-10-CM

## 2020-10-12 DIAGNOSIS — E55.9 VITAMIN D DEFICIENCY: ICD-10-CM

## 2020-10-12 PROCEDURE — 80050 GENERAL HEALTH PANEL: CPT | Performed by: INTERNAL MEDICINE

## 2020-10-12 PROCEDURE — 84153 ASSAY OF PSA TOTAL: CPT | Performed by: INTERNAL MEDICINE

## 2020-10-12 PROCEDURE — 82306 VITAMIN D 25 HYDROXY: CPT | Performed by: INTERNAL MEDICINE

## 2020-10-12 PROCEDURE — 36415 COLL VENOUS BLD VENIPUNCTURE: CPT | Performed by: INTERNAL MEDICINE

## 2020-10-12 PROCEDURE — 80061 LIPID PANEL: CPT | Performed by: INTERNAL MEDICINE

## 2020-10-28 NOTE — H&P
HPI:     Sowmya Walters is a 54year old AA male with a PMH of allergies, OA, herpes, BPH/LUTS. He presents as a consult from Dr Austen Torres office with elevated PSA and BPH/LUTS.     Saw Dr Brenden Botello last year when PSA manuel to 5.36 but decreased to 3.51 t patient today with > 50% of time spent counseling.     HISTORY:  Past Medical History:   Diagnosis Date   • Acute sinusitis, unspecified    • Arthritis Jan 2018    Mainly knees   • Bronchitis, not specified as acute or chronic    • Cough    • Dermatophytosi APPEARANCE: well, developed, well nourished, in no acute distress  NEUROLOGIC: nonfocal, alert and oriented  HEAD: normocephalic, atraumatic  EYES: sclera non-icteric  EARS: hearing intact  ORAL CAVITY: mucosa moist  NECK/THYROID: no obvious goiter or mass

## 2020-11-04 ENCOUNTER — OFFICE VISIT (OUTPATIENT)
Dept: SURGERY | Facility: CLINIC | Age: 55
End: 2020-11-04
Payer: COMMERCIAL

## 2020-11-04 VITALS — TEMPERATURE: 97 F | HEART RATE: 84 BPM | SYSTOLIC BLOOD PRESSURE: 145 MMHG | DIASTOLIC BLOOD PRESSURE: 79 MMHG

## 2020-11-04 DIAGNOSIS — R82.90 URINE FINDING: ICD-10-CM

## 2020-11-04 DIAGNOSIS — N13.8 BPH WITH OBSTRUCTION/LOWER URINARY TRACT SYMPTOMS: ICD-10-CM

## 2020-11-04 DIAGNOSIS — R97.20 ELEVATED PSA: Primary | ICD-10-CM

## 2020-11-04 DIAGNOSIS — N40.1 BPH WITH OBSTRUCTION/LOWER URINARY TRACT SYMPTOMS: ICD-10-CM

## 2020-11-04 PROCEDURE — 81003 URINALYSIS AUTO W/O SCOPE: CPT | Performed by: UROLOGY

## 2020-11-04 PROCEDURE — 3078F DIAST BP <80 MM HG: CPT | Performed by: UROLOGY

## 2020-11-04 PROCEDURE — 3077F SYST BP >= 140 MM HG: CPT | Performed by: UROLOGY

## 2020-11-04 PROCEDURE — 99214 OFFICE O/P EST MOD 30 MIN: CPT | Performed by: UROLOGY

## 2020-11-04 PROCEDURE — 99072 ADDL SUPL MATRL&STAF TM PHE: CPT | Performed by: UROLOGY

## 2021-03-12 ENCOUNTER — OFFICE VISIT (OUTPATIENT)
Dept: INTERNAL MEDICINE CLINIC | Facility: CLINIC | Age: 56
End: 2021-03-12
Payer: COMMERCIAL

## 2021-03-12 ENCOUNTER — HOSPITAL ENCOUNTER (OUTPATIENT)
Dept: GENERAL RADIOLOGY | Age: 56
Discharge: HOME | End: 2021-03-12
Attending: INTERNAL MEDICINE
Payer: COMMERCIAL

## 2021-03-12 VITALS
HEIGHT: 74 IN | WEIGHT: 196 LBS | RESPIRATION RATE: 16 BRPM | TEMPERATURE: 97 F | HEART RATE: 86 BPM | OXYGEN SATURATION: 98 % | DIASTOLIC BLOOD PRESSURE: 72 MMHG | BODY MASS INDEX: 25.15 KG/M2 | SYSTOLIC BLOOD PRESSURE: 124 MMHG

## 2021-03-12 DIAGNOSIS — M54.2 CHRONIC NECK PAIN: Primary | ICD-10-CM

## 2021-03-12 DIAGNOSIS — G89.29 CHRONIC NECK PAIN: ICD-10-CM

## 2021-03-12 DIAGNOSIS — M54.2 CHRONIC NECK PAIN: ICD-10-CM

## 2021-03-12 DIAGNOSIS — R29.898 JAW CLICKING: ICD-10-CM

## 2021-03-12 DIAGNOSIS — G89.29 CHRONIC NECK PAIN: Primary | ICD-10-CM

## 2021-03-12 PROCEDURE — 3078F DIAST BP <80 MM HG: CPT | Performed by: INTERNAL MEDICINE

## 2021-03-12 PROCEDURE — 3074F SYST BP LT 130 MM HG: CPT | Performed by: INTERNAL MEDICINE

## 2021-03-12 PROCEDURE — 99213 OFFICE O/P EST LOW 20 MIN: CPT | Performed by: INTERNAL MEDICINE

## 2021-03-12 PROCEDURE — 3008F BODY MASS INDEX DOCD: CPT | Performed by: INTERNAL MEDICINE

## 2021-03-12 PROCEDURE — 72040 X-RAY EXAM NECK SPINE 2-3 VW: CPT | Performed by: INTERNAL MEDICINE

## 2021-03-12 RX ORDER — CYCLOBENZAPRINE HCL 10 MG
10 TABLET ORAL 2 TIMES DAILY PRN
Qty: 14 TABLET | Refills: 1 | Status: SHIPPED | OUTPATIENT
Start: 2021-03-12 | End: 2021-03-19

## 2021-03-12 NOTE — PROGRESS NOTES
Avril Drake  9/17/1965    Patient presents with:  Pain: RG rm 6 reoccuring neck pain and TMJ      SUBJECTIVE   Avril Drake is a 54year old male who presents with a chronic neck pain and acute right jaw discomfort.     The patient describes a posterior (upper respiratory infection) 8/23/2013      Patient Active Problem List:     Herpes simplex without mention of complication     Unspecified vitamin D deficiency     Laboratory examination ordered as part of a routine general medical examination     Specia male who presents with a chronic neck pain and acute right jaw discomfort.     Chronic neck pain:  No focal neurological deficits  Xray of cervical spine ordered  Oral muscle relaxant therapy prescribed  Further management pending imaging findings    Right

## 2021-03-17 ENCOUNTER — OFFICE VISIT (OUTPATIENT)
Dept: SURGERY | Facility: CLINIC | Age: 56
End: 2021-03-17
Payer: COMMERCIAL

## 2021-03-17 VITALS
DIASTOLIC BLOOD PRESSURE: 82 MMHG | WEIGHT: 196 LBS | SYSTOLIC BLOOD PRESSURE: 118 MMHG | HEIGHT: 74 IN | BODY MASS INDEX: 25.15 KG/M2

## 2021-03-17 DIAGNOSIS — M54.2 CERVICALGIA: Primary | ICD-10-CM

## 2021-03-17 DIAGNOSIS — M43.6 STIFFNESS OF CERVICAL SPINE: ICD-10-CM

## 2021-03-17 PROCEDURE — 3008F BODY MASS INDEX DOCD: CPT | Performed by: PHYSICIAN ASSISTANT

## 2021-03-17 PROCEDURE — 3079F DIAST BP 80-89 MM HG: CPT | Performed by: PHYSICIAN ASSISTANT

## 2021-03-17 PROCEDURE — 99213 OFFICE O/P EST LOW 20 MIN: CPT | Performed by: PHYSICIAN ASSISTANT

## 2021-03-17 PROCEDURE — 3074F SYST BP LT 130 MM HG: CPT | Performed by: PHYSICIAN ASSISTANT

## 2021-03-17 RX ORDER — CLINDAMYCIN PHOSPHATE 10 MG/ML
LOTION TOPICAL
COMMUNITY
Start: 2020-12-03

## 2021-03-17 RX ORDER — CYCLOBENZAPRINE HCL 5 MG
TABLET ORAL
COMMUNITY
Start: 2021-03-16 | End: 2021-03-17

## 2021-03-17 RX ORDER — ALCLOMETASONE DIPROPIONATE 0.5 MG/G
OINTMENT TOPICAL
COMMUNITY
Start: 2020-12-01

## 2021-03-17 NOTE — PROGRESS NOTES
On and off for the last 1 year plus  Comes and goes, very active notices it more when working out  Has had to be working in front of computer the last year believes maybe some poor posture might have played a role    No accident or injury    Pain in the mi

## 2021-03-17 NOTE — H&P
Neurosurgery Clinic Visit  3/17/2021    Bogdan Rebolledo PCP:  Amy Gusman MD    1965 MRN [id number]       CC:  Neck Pain/Tightness    HPI:    Ingrid Garcia is a very pleasant 54year old male who presents with over a year of intermittent neck pain and tig Never used    Substance and Sexual Activity      Alcohol use:  Yes        Alcohol/week: 1.0 standard drinks        Types: 1 Standard drinks or equivalent per week        Comment: 1-2 per week      Drug use: Never      Sexual activity: Yes    Other Topics detailed skin exam was not performed.   Neurologic: Alert and Oriented x 3, CN 2-12 GI, Speech Fluent, Negative Romberg, Negative Pronator Drift, Finger-to-Nose Intact, SILT, Gait Normal, Negative Lhermitte's, Negative Spurling  Palpation Right  Left [place]lin

## 2021-04-27 NOTE — PROGRESS NOTES
HPI:     Lexine Apgar is a 54year old AA male with a PMH of allergies, OA, herpes. Following for:  1. Elevated PSA  2. BPH/LUTS  3. Urinary frequency/OAB    Presents for 6 mo check-up.   PCP - Schriedel    Saw Dr Luis Rosas last year when PSA manuel to LUTS.  Incontinence: none  ISRAEL shows 30-40 g prostate, no nodules or tenderness    UA is negative and PVR is 22 mL.     Gross hematuria: none  Tobacco hx: none  Kidney stone hx: none  Fam h/o  malignancy: maternal uncle dx in late 62s    Reports ~ 100 % p Paternal Grandfather         Open heart surgery      Social History: Social History    Tobacco Use      Smoking status: Never Smoker      Smokeless tobacco: Never Used    Vaping Use      Vaping Use: Never used    Alcohol use:  Yes      Alcohol/week: 1.0 sta [facility]  Office: [phone]

## 2021-04-29 RX ORDER — FLUTICASONE PROPIONATE 50 MCG
SPRAY, SUSPENSION (ML) NASAL
Qty: 16 G | Refills: 0 | Status: SHIPPED | OUTPATIENT
Start: 2021-04-29 | End: 2022-01-12

## 2021-05-05 ENCOUNTER — TELEPHONE (OUTPATIENT)
Dept: SURGERY | Facility: CLINIC | Age: 56
End: 2021-05-05

## 2021-05-05 ENCOUNTER — LAB ENCOUNTER (OUTPATIENT)
Dept: LAB | Age: 56
End: 2021-05-05
Attending: UROLOGY
Payer: COMMERCIAL

## 2021-05-05 ENCOUNTER — OFFICE VISIT (OUTPATIENT)
Dept: SURGERY | Facility: CLINIC | Age: 56
End: 2021-05-05
Payer: COMMERCIAL

## 2021-05-05 VITALS — DIASTOLIC BLOOD PRESSURE: 78 MMHG | SYSTOLIC BLOOD PRESSURE: 123 MMHG | HEART RATE: 67 BPM

## 2021-05-05 DIAGNOSIS — N40.1 BPH WITH OBSTRUCTION/LOWER URINARY TRACT SYMPTOMS: ICD-10-CM

## 2021-05-05 DIAGNOSIS — N13.8 BPH WITH OBSTRUCTION/LOWER URINARY TRACT SYMPTOMS: ICD-10-CM

## 2021-05-05 DIAGNOSIS — R39.15 URINARY URGENCY: ICD-10-CM

## 2021-05-05 DIAGNOSIS — R97.20 ELEVATED PSA: ICD-10-CM

## 2021-05-05 DIAGNOSIS — R97.20 ELEVATED PSA: Primary | ICD-10-CM

## 2021-05-05 PROCEDURE — 81003 URINALYSIS AUTO W/O SCOPE: CPT | Performed by: UROLOGY

## 2021-05-05 PROCEDURE — 3078F DIAST BP <80 MM HG: CPT | Performed by: UROLOGY

## 2021-05-05 PROCEDURE — 84154 ASSAY OF PSA FREE: CPT

## 2021-05-05 PROCEDURE — 3074F SYST BP LT 130 MM HG: CPT | Performed by: UROLOGY

## 2021-05-05 PROCEDURE — 36415 COLL VENOUS BLD VENIPUNCTURE: CPT

## 2021-05-05 PROCEDURE — 84153 ASSAY OF PSA TOTAL: CPT

## 2021-05-05 PROCEDURE — 99214 OFFICE O/P EST MOD 30 MIN: CPT | Performed by: UROLOGY

## 2021-05-05 NOTE — TELEPHONE ENCOUNTER
Grace Holden calling from [facility] calling states needs Order to say Biospy planning, 3D rendering, Aneesh Sic, DynaCad please advise

## 2021-05-10 ENCOUNTER — TELEPHONE (OUTPATIENT)
Dept: SURGERY | Facility: CLINIC | Age: 56
End: 2021-05-10

## 2021-05-10 NOTE — TELEPHONE ENCOUNTER
MRI reviewed and pt notified. Shows 56 gram prostate with diffuse PiRads 2 lesions, low likelihood for CaP. He does not have significant LUTS at this time and declines BPH meds.  Discussed options for PSA and he would like continue annual PSA checks with

## 2021-08-16 ENCOUNTER — OFFICE VISIT (OUTPATIENT)
Dept: PHYSICAL THERAPY | Age: 56
End: 2021-08-16
Attending: PHYSICIAN ASSISTANT
Payer: COMMERCIAL

## 2021-08-16 DIAGNOSIS — M54.2 CERVICALGIA: ICD-10-CM

## 2021-08-16 DIAGNOSIS — M43.6 STIFFNESS OF CERVICAL SPINE: ICD-10-CM

## 2021-08-16 PROCEDURE — 97110 THERAPEUTIC EXERCISES: CPT

## 2021-08-16 PROCEDURE — 97161 PT EVAL LOW COMPLEX 20 MIN: CPT

## 2021-08-18 ENCOUNTER — TELEPHONE (OUTPATIENT)
Dept: PHYSICAL THERAPY | Facility: HOSPITAL | Age: 56
End: 2021-08-18

## 2021-08-20 ENCOUNTER — OFFICE VISIT (OUTPATIENT)
Dept: PHYSICAL THERAPY | Age: 56
End: 2021-08-20
Attending: PHYSICIAN ASSISTANT
Payer: COMMERCIAL

## 2021-08-20 DIAGNOSIS — M43.6 STIFFNESS OF CERVICAL SPINE: ICD-10-CM

## 2021-08-20 DIAGNOSIS — M54.2 CERVICALGIA: ICD-10-CM

## 2021-08-20 PROCEDURE — 97112 NEUROMUSCULAR REEDUCATION: CPT

## 2021-08-20 PROCEDURE — 97110 THERAPEUTIC EXERCISES: CPT

## 2021-08-20 PROCEDURE — 97140 MANUAL THERAPY 1/> REGIONS: CPT

## 2021-08-20 NOTE — PROGRESS NOTES
Dx: Cervicalgia (M54.2)  Stiffness of cervical spine (M43.6)              Insurance (Authorized # of Visits):  6           Authorizing Physician: Dr. Yaa Carter  Next MD visit: none scheduled  Fall Risk: standard         Precautions: n/a             Subjective: Seated thoracic rotation.   8/20/21 foam roll handout super 6, thoracic ext, core options      Charges: TE2 NR man       Total Timed Treatment: 60 min  Total Treatment Time: 60 min

## 2021-08-23 ENCOUNTER — APPOINTMENT (OUTPATIENT)
Dept: PHYSICAL THERAPY | Age: 56
End: 2021-08-23
Attending: PHYSICIAN ASSISTANT
Payer: COMMERCIAL

## 2021-08-27 ENCOUNTER — OFFICE VISIT (OUTPATIENT)
Dept: PHYSICAL THERAPY | Age: 56
End: 2021-08-27
Attending: PHYSICIAN ASSISTANT
Payer: COMMERCIAL

## 2021-08-27 PROCEDURE — 97140 MANUAL THERAPY 1/> REGIONS: CPT

## 2021-08-27 PROCEDURE — 97112 NEUROMUSCULAR REEDUCATION: CPT

## 2021-08-27 PROCEDURE — 97110 THERAPEUTIC EXERCISES: CPT

## 2021-08-27 NOTE — PROGRESS NOTES
Dx: Cervicalgia (M54.2)  Stiffness of cervical spine (M43.6)              Insurance (Authorized # of Visits):  6           Authorizing Physician: Dr. Lucero Chow  Next MD visit: none scheduled  Fall Risk: standard         Precautions: n/a             Subjective: advanced SLR  Foam TrA march SL  Plank pt ed     SB prone     Manual  Cervical distraction 60\" holds and SOR  Mid cervical upslopes L/R grade II   Manual  Cervical distraction 60\" holds and SOR  pect minor release  Mid cervical upslopes L/R grade II

## 2021-08-30 ENCOUNTER — OFFICE VISIT (OUTPATIENT)
Dept: PHYSICAL THERAPY | Age: 56
End: 2021-08-30
Attending: PHYSICIAN ASSISTANT
Payer: COMMERCIAL

## 2021-08-30 PROCEDURE — 97110 THERAPEUTIC EXERCISES: CPT

## 2021-08-30 PROCEDURE — 97112 NEUROMUSCULAR REEDUCATION: CPT

## 2021-08-30 PROCEDURE — 97140 MANUAL THERAPY 1/> REGIONS: CPT

## 2021-08-30 NOTE — PROGRESS NOTES
Dx: Cervicalgia (M54.2)  Stiffness of cervical spine (M43.6)              Insurance (Authorized # of Visits):  6           Authorizing Physician: Dr. Diego Andrew  Next MD visit: none scheduled  Fall Risk: standard         Precautions: n/a             Subjective: roll - review Super 6 posture ex.   Foam roll mid thoracic ext       NR  Posture review sit, ergo considerations, neutral spine  TrA control train foam roll march NR   neutral spine training  TrA progression/options for home  [facility] level 1 --> advanced S

## 2021-09-01 ENCOUNTER — OFFICE VISIT (OUTPATIENT)
Dept: PHYSICAL THERAPY | Age: 56
End: 2021-09-01
Attending: PHYSICIAN ASSISTANT
Payer: COMMERCIAL

## 2021-09-01 PROCEDURE — 97112 NEUROMUSCULAR REEDUCATION: CPT

## 2021-09-01 PROCEDURE — 97110 THERAPEUTIC EXERCISES: CPT

## 2021-09-01 PROCEDURE — 97140 MANUAL THERAPY 1/> REGIONS: CPT

## 2021-09-01 NOTE — PROGRESS NOTES
Dx: Cervicalgia (M54.2)  Stiffness of cervical spine (M43.6)              Insurance (Authorized # of Visits):  6           Authorizing Physician: Dr. Gallegos Neither  Next MD visit: none scheduled  Fall Risk: standard         Precautions: n/a             Subjective: levels  Table upper thoracic stretch - added to HEP  SB wall walk thoracic ext 5x    NR  Posture review sit, ergo considerations, neutral spine  TrA control train foam roll march NR   neutral spine training  TrA progression/options for home  [place] level

## 2021-09-03 ENCOUNTER — APPOINTMENT (OUTPATIENT)
Dept: PHYSICAL THERAPY | Age: 56
End: 2021-09-03
Attending: PHYSICIAN ASSISTANT
Payer: COMMERCIAL

## 2021-09-08 ENCOUNTER — OFFICE VISIT (OUTPATIENT)
Dept: PHYSICAL THERAPY | Age: 56
End: 2021-09-08
Attending: PHYSICIAN ASSISTANT
Payer: COMMERCIAL

## 2021-09-08 PROCEDURE — 97140 MANUAL THERAPY 1/> REGIONS: CPT

## 2021-09-08 PROCEDURE — 97110 THERAPEUTIC EXERCISES: CPT

## 2021-09-08 PROCEDURE — 97112 NEUROMUSCULAR REEDUCATION: CPT

## 2021-09-08 NOTE — PROGRESS NOTES
Dx: Cervicalgia (M54.2)  Stiffness of cervical spine (M43.6)              Insurance (Authorized # of Visits):  6           Authorizing Physician: Dr. Mahogany Diez  Next MD visit: none scheduled  Fall Risk: standard         Precautions: n/a             Subjective: 5x  Foam roll CT self mobilization   Foam roll thoracic ext self mobe various levels  Table upper thoracic stretch - added to HEP  SB wall walk thoracic ext 5x TE  Nustep 5'  SB wall walk thoracic ext 5x  Foam roll - Super 6 review  Foam thoracic ext  HEP

## 2021-09-10 ENCOUNTER — APPOINTMENT (OUTPATIENT)
Dept: PHYSICAL THERAPY | Age: 56
End: 2021-09-10
Attending: PHYSICIAN ASSISTANT
Payer: COMMERCIAL

## 2021-09-29 ENCOUNTER — OFFICE VISIT (OUTPATIENT)
Dept: PHYSICAL THERAPY | Age: 56
End: 2021-09-29
Attending: PHYSICIAN ASSISTANT
Payer: COMMERCIAL

## 2021-09-29 PROCEDURE — 97140 MANUAL THERAPY 1/> REGIONS: CPT

## 2021-09-29 PROCEDURE — 97112 NEUROMUSCULAR REEDUCATION: CPT

## 2021-09-29 PROCEDURE — 97110 THERAPEUTIC EXERCISES: CPT

## 2021-09-29 NOTE — PROGRESS NOTES
Dx: Cervicalgia (M54.2)  Stiffness of cervical spine (M43.6)              Insurance (Authorized # of Visits):  6           Authorizing Physician: Dr. Honeycutt Standard  Next MD visit: none scheduled  Fall Risk: standard         Precautions: n/a                 Progres 9/1/21               TX#: 5/8 9/29/21 6/8    TE  Nustep 5'  Foam roll instruction:  -Alt arm reach flex, at side  -pect stretch Y I  -W sets 10x  -Thoracic ext  HEP review  3 way prayer stretch 10\"  thread the needle instructed vs sitting thoracic rot TE sitting/ supine options, doorway stretch. Seated thoracic rotation.   8/20/21 foam roll handout super 6, thoracic ext, core options  9/1/21 table 4 point thoracic ext.   9/29/21 4 point UE/LE      Charges: TE NR man       Total Timed Treatment: 45 min  Tota

## 2021-12-13 ENCOUNTER — TELEPHONE (OUTPATIENT)
Dept: INTERNAL MEDICINE CLINIC | Facility: CLINIC | Age: 56
End: 2021-12-13

## 2021-12-13 ENCOUNTER — OFFICE VISIT (OUTPATIENT)
Dept: INTERNAL MEDICINE CLINIC | Facility: CLINIC | Age: 56
End: 2021-12-13
Payer: COMMERCIAL

## 2021-12-13 VITALS
BODY MASS INDEX: 26.18 KG/M2 | DIASTOLIC BLOOD PRESSURE: 74 MMHG | HEIGHT: 74 IN | SYSTOLIC BLOOD PRESSURE: 114 MMHG | TEMPERATURE: 97 F | HEART RATE: 72 BPM | WEIGHT: 204 LBS

## 2021-12-13 DIAGNOSIS — L50.9 URTICARIA: ICD-10-CM

## 2021-12-13 DIAGNOSIS — L28.2 PRURITIC RASH: Primary | ICD-10-CM

## 2021-12-13 PROCEDURE — 3008F BODY MASS INDEX DOCD: CPT | Performed by: FAMILY MEDICINE

## 2021-12-13 PROCEDURE — 99213 OFFICE O/P EST LOW 20 MIN: CPT | Performed by: FAMILY MEDICINE

## 2021-12-13 PROCEDURE — 3078F DIAST BP <80 MM HG: CPT | Performed by: FAMILY MEDICINE

## 2021-12-13 PROCEDURE — 3074F SYST BP LT 130 MM HG: CPT | Performed by: FAMILY MEDICINE

## 2021-12-13 RX ORDER — METHYLPREDNISOLONE 4 MG/1
TABLET ORAL
Qty: 1 EACH | Refills: 0 | Status: SHIPPED | OUTPATIENT
Start: 2021-12-13

## 2021-12-13 NOTE — TELEPHONE ENCOUNTER
Pt scheduled with [facility] today at 10:20. Routing to [facility]. Ok to keep appt?

## 2021-12-13 NOTE — TELEPHONE ENCOUNTER
cpe   Future Appointments   Date Time Provider Whitley Ana   4/20/2022  2:00 PM Keshawn Hardy MD [facility]      Orders to THE [facility] aware must fast no call back required

## 2021-12-13 NOTE — TELEPHONE ENCOUNTER
Pt has a rash since Tues that is not getting better or worse. Scheduled in office appt for   Future Appointments   Date Time Provider Whitley Ana   12/13/2021 10:20 AM Anastasia Lara MD [facility] [address] to keep in office?  Pt has no oth

## 2021-12-13 NOTE — PROGRESS NOTES
Codey Chambers  9/17/1965    Patient presents with:  Rash Skin Problem: AJ rm8 rash that comes and goes      HPI:   Codey Chambers is a 64year old male who presents for evaluation of an intermittent rash that began about one week.  He feels it began while tr prostate     Seasonal allergies     Pain in joint, lower leg     URI (upper respiratory infection)     Hyperplastic colonic polyp     Arthritis of knee     Elevated PSA     Benign prostatic hyperplasia without lower urinary tract symptoms     Personal hist Urticaria  Possibly 2/2 contact dermatitis. Avoid wool as this may have been the trigger. Begin daily Claritin and can use PRN benadryl. If not improving over the next few days, begin medrol dose pack.  If symptoms persistent over the next 2 weeks, recommen

## 2022-01-12 RX ORDER — FLUTICASONE PROPIONATE 50 MCG
SPRAY, SUSPENSION (ML) NASAL
Qty: 16 G | Refills: 0 | Status: SHIPPED | OUTPATIENT
Start: 2022-01-12

## 2022-04-12 ENCOUNTER — LAB ENCOUNTER (OUTPATIENT)
Dept: LAB | Age: 57
End: 2022-04-12
Attending: INTERNAL MEDICINE
Payer: COMMERCIAL

## 2022-04-12 DIAGNOSIS — Z13.0 SCREENING FOR ENDOCRINE, NUTRITIONAL, METABOLIC AND IMMUNITY DISORDER: ICD-10-CM

## 2022-04-12 DIAGNOSIS — Z13.220 SCREENING FOR LIPID DISORDERS: ICD-10-CM

## 2022-04-12 DIAGNOSIS — Z13.228 SCREENING FOR ENDOCRINE, NUTRITIONAL, METABOLIC AND IMMUNITY DISORDER: ICD-10-CM

## 2022-04-12 DIAGNOSIS — Z13.29 SCREENING FOR THYROID DISORDER: ICD-10-CM

## 2022-04-12 DIAGNOSIS — R97.20 ELEVATED PSA: ICD-10-CM

## 2022-04-12 DIAGNOSIS — Z00.00 LABORATORY EXAM ORDERED AS PART OF ROUTINE GENERAL MEDICAL EXAMINATION: ICD-10-CM

## 2022-04-12 DIAGNOSIS — Z13.1 SCREENING FOR DIABETES MELLITUS (DM): ICD-10-CM

## 2022-04-12 DIAGNOSIS — Z13.21 SCREENING FOR ENDOCRINE, NUTRITIONAL, METABOLIC AND IMMUNITY DISORDER: ICD-10-CM

## 2022-04-12 DIAGNOSIS — Z13.29 SCREENING FOR ENDOCRINE, NUTRITIONAL, METABOLIC AND IMMUNITY DISORDER: ICD-10-CM

## 2022-04-12 LAB
ALBUMIN SERPL-MCNC: 4.2 G/DL (ref 3.4–5)
ALBUMIN/GLOB SERPL: 1.5 {RATIO} (ref 1–2)
ALP LIVER SERPL-CCNC: 59 U/L
ALT SERPL-CCNC: 32 U/L
ANION GAP SERPL CALC-SCNC: 4 MMOL/L (ref 0–18)
AST SERPL-CCNC: 27 U/L (ref 15–37)
BASOPHILS # BLD AUTO: 0.04 X10(3) UL (ref 0–0.2)
BASOPHILS NFR BLD AUTO: 0.8 %
BILIRUB SERPL-MCNC: 0.7 MG/DL (ref 0.1–2)
BUN BLD-MCNC: 18 MG/DL (ref 7–18)
CALCIUM BLD-MCNC: 9.3 MG/DL (ref 8.5–10.1)
CHLORIDE SERPL-SCNC: 105 MMOL/L (ref 98–112)
CHOLEST SERPL-MCNC: 190 MG/DL (ref ?–200)
CO2 SERPL-SCNC: 31 MMOL/L (ref 21–32)
COMPLEXED PSA SERPL-MCNC: 4.02 NG/ML (ref ?–4)
CREAT BLD-MCNC: 1.14 MG/DL
EOSINOPHIL # BLD AUTO: 0.14 X10(3) UL (ref 0–0.7)
EOSINOPHIL NFR BLD AUTO: 3 %
ERYTHROCYTE [DISTWIDTH] IN BLOOD BY AUTOMATED COUNT: 12.7 %
FASTING PATIENT LIPID ANSWER: YES
FASTING STATUS PATIENT QL REPORTED: YES
GLOBULIN PLAS-MCNC: 2.8 G/DL (ref 2.8–4.4)
GLUCOSE BLD-MCNC: 98 MG/DL (ref 70–99)
HCT VFR BLD AUTO: 48.4 %
HDLC SERPL-MCNC: 54 MG/DL (ref 40–59)
HGB BLD-MCNC: 15.6 G/DL
IMM GRANULOCYTES # BLD AUTO: 0.01 X10(3) UL (ref 0–1)
IMM GRANULOCYTES NFR BLD: 0.2 %
LDLC SERPL CALC-MCNC: 118 MG/DL (ref ?–100)
LYMPHOCYTES # BLD AUTO: 2.2 X10(3) UL (ref 1–4)
LYMPHOCYTES NFR BLD AUTO: 46.4 %
MCH RBC QN AUTO: 26.8 PG (ref 26–34)
MCHC RBC AUTO-ENTMCNC: 32.2 G/DL (ref 31–37)
MCV RBC AUTO: 83.2 FL
MONOCYTES # BLD AUTO: 0.4 X10(3) UL (ref 0.1–1)
MONOCYTES NFR BLD AUTO: 8.4 %
NEUTROPHILS # BLD AUTO: 1.95 X10 (3) UL (ref 1.5–7.7)
NEUTROPHILS # BLD AUTO: 1.95 X10(3) UL (ref 1.5–7.7)
NEUTROPHILS NFR BLD AUTO: 41.2 %
NONHDLC SERPL-MCNC: 136 MG/DL (ref ?–130)
OSMOLALITY SERPL CALC.SUM OF ELEC: 292 MOSM/KG (ref 275–295)
PLATELET # BLD AUTO: 263 10(3)UL (ref 150–450)
POTASSIUM SERPL-SCNC: 4.4 MMOL/L (ref 3.5–5.1)
PROT SERPL-MCNC: 7 G/DL (ref 6.4–8.2)
RBC # BLD AUTO: 5.82 X10(6)UL
SODIUM SERPL-SCNC: 140 MMOL/L (ref 136–145)
TRIGL SERPL-MCNC: 99 MG/DL (ref 30–149)
TSI SER-ACNC: 2.88 MIU/ML (ref 0.36–3.74)
VLDLC SERPL CALC-MCNC: 17 MG/DL (ref 0–30)
WBC # BLD AUTO: 4.7 X10(3) UL (ref 4–11)

## 2022-04-12 PROCEDURE — 80061 LIPID PANEL: CPT

## 2022-04-12 PROCEDURE — 85025 COMPLETE CBC W/AUTO DIFF WBC: CPT

## 2022-04-12 PROCEDURE — 84443 ASSAY THYROID STIM HORMONE: CPT

## 2022-04-12 PROCEDURE — 80053 COMPREHEN METABOLIC PANEL: CPT

## 2022-04-12 PROCEDURE — 36415 COLL VENOUS BLD VENIPUNCTURE: CPT

## 2022-04-20 ENCOUNTER — TELEMEDICINE (OUTPATIENT)
Dept: INTERNAL MEDICINE CLINIC | Facility: CLINIC | Age: 57
End: 2022-04-20
Payer: COMMERCIAL

## 2022-04-20 DIAGNOSIS — U07.1 COVID-19 VIRUS INFECTION: Primary | ICD-10-CM

## 2022-04-20 PROCEDURE — 99213 OFFICE O/P EST LOW 20 MIN: CPT | Performed by: INTERNAL MEDICINE

## 2022-04-20 RX ORDER — FLUTICASONE PROPIONATE 50 MCG
2 SPRAY, SUSPENSION (ML) NASAL DAILY
Qty: 1 EACH | Refills: 0 | Status: SHIPPED | OUTPATIENT
Start: 2022-04-20 | End: 2023-04-15

## 2022-04-25 ENCOUNTER — OFFICE VISIT (OUTPATIENT)
Dept: INTERNAL MEDICINE CLINIC | Facility: CLINIC | Age: 57
End: 2022-04-25
Payer: COMMERCIAL

## 2022-04-25 VITALS
DIASTOLIC BLOOD PRESSURE: 74 MMHG | HEIGHT: 74 IN | TEMPERATURE: 97 F | WEIGHT: 203 LBS | SYSTOLIC BLOOD PRESSURE: 132 MMHG | BODY MASS INDEX: 26.05 KG/M2 | HEART RATE: 82 BPM

## 2022-04-25 DIAGNOSIS — Z00.00 PE (PHYSICAL EXAM), ANNUAL: Primary | ICD-10-CM

## 2022-04-25 PROCEDURE — 3075F SYST BP GE 130 - 139MM HG: CPT | Performed by: INTERNAL MEDICINE

## 2022-04-25 PROCEDURE — 3008F BODY MASS INDEX DOCD: CPT | Performed by: INTERNAL MEDICINE

## 2022-04-25 PROCEDURE — 99396 PREV VISIT EST AGE 40-64: CPT | Performed by: INTERNAL MEDICINE

## 2022-04-25 PROCEDURE — 3078F DIAST BP <80 MM HG: CPT | Performed by: INTERNAL MEDICINE

## 2022-04-25 RX ORDER — FEXOFENADINE HCL 180 MG/1
1 TABLET ORAL DAILY
COMMUNITY
Start: 2021-12-01

## 2022-04-25 RX ORDER — CETIRIZINE HYDROCHLORIDE 10 MG/1
1 CAPSULE, LIQUID FILLED ORAL NIGHTLY
COMMUNITY
Start: 2021-12-01

## 2022-04-25 RX ORDER — CLINDAMYCIN PHOSPHATE 11.9 MG/ML
SOLUTION TOPICAL
COMMUNITY
Start: 2022-01-20

## 2022-06-22 ENCOUNTER — TELEPHONE (OUTPATIENT)
Dept: INTERNAL MEDICINE CLINIC | Facility: CLINIC | Age: 57
End: 2022-06-22

## 2022-06-22 DIAGNOSIS — Z23 NEED FOR SHINGLES VACCINE: Primary | ICD-10-CM

## 2022-06-22 NOTE — TELEPHONE ENCOUNTER
1st shingles shot scheduled for   Future Appointments   Date Time Provider Whitley Perez   6/24/2022 11:00 AM [facility]     Pt thinks he had chix pox as a kid

## 2022-06-22 NOTE — TELEPHONE ENCOUNTER
LOV 4/25/22    Pended. · Magnesium 1 4 on admission  · Ordered 1 g IV magnesium   · Continue to monitor and replete as needed

## 2022-06-24 ENCOUNTER — NURSE ONLY (OUTPATIENT)
Dept: INTERNAL MEDICINE CLINIC | Facility: CLINIC | Age: 57
End: 2022-06-24
Payer: COMMERCIAL

## 2022-06-24 PROCEDURE — 90750 HZV VACC RECOMBINANT IM: CPT | Performed by: INTERNAL MEDICINE

## 2022-06-24 PROCEDURE — 90471 IMMUNIZATION ADMIN: CPT | Performed by: INTERNAL MEDICINE

## 2022-08-01 ENCOUNTER — OFFICE VISIT (OUTPATIENT)
Dept: INTERNAL MEDICINE CLINIC | Facility: CLINIC | Age: 57
End: 2022-08-01
Payer: COMMERCIAL

## 2022-08-01 ENCOUNTER — HOSPITAL ENCOUNTER (OUTPATIENT)
Dept: GENERAL RADIOLOGY | Age: 57
Discharge: HOME | End: 2022-08-01
Attending: FAMILY MEDICINE
Payer: COMMERCIAL

## 2022-08-01 VITALS
SYSTOLIC BLOOD PRESSURE: 122 MMHG | RESPIRATION RATE: 14 BRPM | WEIGHT: 197.38 LBS | HEART RATE: 65 BPM | DIASTOLIC BLOOD PRESSURE: 64 MMHG | OXYGEN SATURATION: 99 % | BODY MASS INDEX: 25.33 KG/M2 | HEIGHT: 74 IN | TEMPERATURE: 98 F

## 2022-08-01 DIAGNOSIS — M25.512 ACUTE PAIN OF LEFT SHOULDER: ICD-10-CM

## 2022-08-01 DIAGNOSIS — M25.512 ACUTE PAIN OF LEFT SHOULDER: Primary | ICD-10-CM

## 2022-08-01 PROCEDURE — 3008F BODY MASS INDEX DOCD: CPT | Performed by: FAMILY MEDICINE

## 2022-08-01 PROCEDURE — 73030 X-RAY EXAM OF SHOULDER: CPT | Performed by: FAMILY MEDICINE

## 2022-08-01 PROCEDURE — 3078F DIAST BP <80 MM HG: CPT | Performed by: FAMILY MEDICINE

## 2022-08-01 PROCEDURE — 99214 OFFICE O/P EST MOD 30 MIN: CPT | Performed by: FAMILY MEDICINE

## 2022-08-01 PROCEDURE — 3074F SYST BP LT 130 MM HG: CPT | Performed by: FAMILY MEDICINE

## 2022-08-01 RX ORDER — NAPROXEN 500 MG/1
500 TABLET ORAL 2 TIMES DAILY WITH MEALS
Qty: 28 TABLET | Refills: 0 | Status: SHIPPED | OUTPATIENT
Start: 2022-08-01 | End: 2022-08-15

## 2022-08-03 ENCOUNTER — TELEPHONE (OUTPATIENT)
Dept: INTERNAL MEDICINE CLINIC | Facility: CLINIC | Age: 57
End: 2022-08-03

## 2022-08-03 NOTE — TELEPHONE ENCOUNTER
LOV 8/1/22 with [address]. Left Shoulder xray 8/1/22. Results:    IMPRESSION:   Unremarkable radiographs of the left shoulder. [address], do you want to see pt today?

## 2022-08-03 NOTE — TELEPHONE ENCOUNTER
Patient is calling and saw Dr India Granda on Monday  Dr India Granda sent him for x-rays    Today the shoulder is very swollen   Patient is leaving for out of town and wants to know what to do  Would like to know if he should be seen again

## 2022-08-03 NOTE — TELEPHONE ENCOUNTER
Discussed with patient. Will switch from Naproxen to diclofenac, continue cryotherapy. Keep follow-up when he returns, will need advanced imaging at that time.

## 2022-08-16 ENCOUNTER — OFFICE VISIT (OUTPATIENT)
Dept: INTERNAL MEDICINE CLINIC | Facility: CLINIC | Age: 57
End: 2022-08-16
Payer: COMMERCIAL

## 2022-08-16 VITALS
BODY MASS INDEX: 25.93 KG/M2 | SYSTOLIC BLOOD PRESSURE: 116 MMHG | WEIGHT: 202 LBS | HEIGHT: 74 IN | DIASTOLIC BLOOD PRESSURE: 82 MMHG | RESPIRATION RATE: 18 BRPM | HEART RATE: 91 BPM

## 2022-08-16 DIAGNOSIS — S29.011A PECTORALIS MUSCLE STRAIN, INITIAL ENCOUNTER: ICD-10-CM

## 2022-08-16 DIAGNOSIS — M25.512 ACUTE PAIN OF LEFT SHOULDER: Primary | ICD-10-CM

## 2022-08-16 PROCEDURE — 3074F SYST BP LT 130 MM HG: CPT | Performed by: FAMILY MEDICINE

## 2022-08-16 PROCEDURE — 3008F BODY MASS INDEX DOCD: CPT | Performed by: FAMILY MEDICINE

## 2022-08-16 PROCEDURE — 99214 OFFICE O/P EST MOD 30 MIN: CPT | Performed by: FAMILY MEDICINE

## 2022-08-16 PROCEDURE — 3079F DIAST BP 80-89 MM HG: CPT | Performed by: FAMILY MEDICINE

## 2022-08-19 ENCOUNTER — TELEPHONE (OUTPATIENT)
Dept: INTERNAL MEDICINE CLINIC | Facility: CLINIC | Age: 57
End: 2022-08-19

## 2022-08-19 DIAGNOSIS — M25.512 ACUTE PAIN OF LEFT SHOULDER: Primary | ICD-10-CM

## 2022-08-19 DIAGNOSIS — S29.011A PECTORALIS MUSCLE STRAIN, INITIAL ENCOUNTER: ICD-10-CM

## 2022-08-19 NOTE — TELEPHONE ENCOUNTER
Appear pt initially seen for shoulder pain by [facility] 8/1/22. XR has already been completed. Routing to [facility] for review/any further recs.

## 2022-08-25 NOTE — TELEPHONE ENCOUNTER
Spoke to pt. Informed him of below. [address] did peer to peer and has now started an appeal. Will update him when we get an update. Pt thankful for the call.

## 2022-09-09 ENCOUNTER — TELEPHONE (OUTPATIENT)
Dept: INTERNAL MEDICINE CLINIC | Facility: CLINIC | Age: 57
End: 2022-09-09

## 2022-09-09 DIAGNOSIS — M25.512 ACUTE PAIN OF LEFT SHOULDER: Primary | ICD-10-CM

## 2022-09-09 NOTE — TELEPHONE ENCOUNTER
Patient calling to see if MRI has been approved. Patient states he received [employer] message it was denied. If MRI has been denied what are next steps?

## 2022-09-09 NOTE — TELEPHONE ENCOUNTER
[address], pt said he recently spoke with you about his MRI Left Shoulder and was told his MRI was denied. It appears authorized with auth # of [id number]. Pt just wants to make sure with you before he schedules.

## 2022-09-12 ENCOUNTER — HOSPITAL ENCOUNTER (OUTPATIENT)
Dept: MRI IMAGING | Age: 57
Discharge: HOME | End: 2022-09-12
Attending: FAMILY MEDICINE
Payer: COMMERCIAL

## 2022-09-12 DIAGNOSIS — S29.011A PECTORALIS MUSCLE STRAIN, INITIAL ENCOUNTER: ICD-10-CM

## 2022-09-12 DIAGNOSIS — M25.512 ACUTE PAIN OF LEFT SHOULDER: ICD-10-CM

## 2022-09-12 PROCEDURE — 73221 MRI JOINT UPR EXTREM W/O DYE: CPT | Performed by: FAMILY MEDICINE

## 2022-09-13 DIAGNOSIS — S29.011D RUPTURE OF PECTORALIS MAJOR MUSCLE, SUBSEQUENT ENCOUNTER: Primary | ICD-10-CM

## 2022-10-03 ENCOUNTER — OFFICE VISIT (OUTPATIENT)
Dept: ORTHOPEDICS CLINIC | Facility: CLINIC | Age: 57
End: 2022-10-03
Payer: COMMERCIAL

## 2022-10-03 ENCOUNTER — TELEPHONE (OUTPATIENT)
Dept: ORTHOPEDICS CLINIC | Facility: CLINIC | Age: 57
End: 2022-10-03

## 2022-10-03 VITALS — HEIGHT: 74 IN | WEIGHT: 195 LBS | BODY MASS INDEX: 25.03 KG/M2

## 2022-10-03 DIAGNOSIS — S29.011A RUPTURE OF PECTORALIS MAJOR MUSCLE, INITIAL ENCOUNTER: Primary | ICD-10-CM

## 2022-10-03 PROCEDURE — 3008F BODY MASS INDEX DOCD: CPT | Performed by: ORTHOPAEDIC SURGERY

## 2022-10-03 PROCEDURE — 99205 OFFICE O/P NEW HI 60 MIN: CPT | Performed by: ORTHOPAEDIC SURGERY

## 2022-10-03 NOTE — TELEPHONE ENCOUNTER
Surgeon: Dr. Shon Alejo    Date of Surgery: 10/27        Post Op Appt: 11/4 @ 9AM     Prior Authorization:   Inpatient or Outpatient: Outpatient  Facility: [facility]  Work Comp: NO  CPT: 52117,85543   DX: S91.414L     Surgical Assistant: Pato LOGAN     Preadmission Testing: PER ANESTHESIA GUIDELINES     Pre-Op Clearance Requested: NONE    DME:  YES, GIVEN AT APPT    Rehab Services Ordered: EXTERNAL THERAPY     Disability Paperwork: NONE    On [place] Calendar: NO    Notes:

## 2022-10-03 NOTE — PROGRESS NOTES
OR BOOKING SHEET SHOULDER  Name: Colette Mckinney  MRN: [id number]   : 1965  Diagnosis:  [x] Rupture of pectoralis major muscle, initial encounter [I50.705N]  Disposition:    [x] Ambulatory  [] Overnight for HERLINDA  [] Overnight for observation and pain control  [] Inpatient procedure    Operative Time Required:  3 hours  Antibiotics: 2 g cefazolin within 60 minutes of surgical incision  Procedure:   Laterality: [] RIGHT [x] LEFT                  [] BILATERAL  Procedures:   [x] Shoulder Arthroscopy  [] Arthrotomy (79561)  [] Arthoscopy/Arthrotomy  [x] Pectoralis Tendon Repair (14523)  [x] Subacromial Decompression ()          Additional info:   [] PCP Clearance Needed  [] MRSA  [] C-Arm  [x] TXA at time surgery  [x] Physical Therapy External - [facility]  [x] DME Rx Needed  [] Appt with Dr. Sidra Stout needed  Implants needed: Arthrex, Semitendinosus and Achilles Allograft  Positioning Equipment: John Saucedo

## 2022-10-06 ENCOUNTER — TELEPHONE (OUTPATIENT)
Dept: ORTHOPEDICS CLINIC | Facility: CLINIC | Age: 57
End: 2022-10-06

## 2022-10-06 NOTE — TELEPHONE ENCOUNTER
PATIENT STATED HE WOULD LIKE TO SPEAK WITH DR. Patito Beard REGARDING DETAILS OF SURGERY. HE WAS IN SHOCK WHEN HE WAS TOLD HE NEEDED SURGERY AND REALLY DIDN'T TAKE IN WHAT DR. Patito Beard EXPLAINED ABOUT THE SURGERY. 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  PLEASE ADVISE

## 2022-10-24 RX ORDER — TRAMADOL HYDROCHLORIDE 50 MG/1
TABLET ORAL
Qty: 20 TABLET | Refills: 1 | Status: SHIPPED | OUTPATIENT
Start: 2022-10-24

## 2022-10-24 RX ORDER — SCOLOPAMINE TRANSDERMAL SYSTEM 1 MG/1
1 PATCH, EXTENDED RELEASE TRANSDERMAL ONCE
Status: CANCELLED | OUTPATIENT
Start: 2022-10-24 | End: 2022-10-24

## 2022-10-24 RX ORDER — ONDANSETRON 4 MG/1
4 TABLET, ORALLY DISINTEGRATING ORAL EVERY 8 HOURS PRN
Qty: 8 TABLET | Refills: 0 | Status: SHIPPED | OUTPATIENT
Start: 2022-10-24

## 2022-10-25 ENCOUNTER — LAB ENCOUNTER (OUTPATIENT)
Dept: LAB | Age: 57
End: 2022-10-25
Attending: ORTHOPAEDIC SURGERY
Payer: COMMERCIAL

## 2022-10-25 DIAGNOSIS — Z20.822 ENCOUNTER FOR PREPROCEDURE SCREENING LABORATORY TESTING FOR COVID-19: ICD-10-CM

## 2022-10-25 DIAGNOSIS — Z01.812 ENCOUNTER FOR PREPROCEDURE SCREENING LABORATORY TESTING FOR COVID-19: ICD-10-CM

## 2022-10-26 LAB — SARS-COV-2 RNA RESP QL NAA+PROBE: NOT DETECTED

## 2022-10-27 ENCOUNTER — ANESTHESIA (OUTPATIENT)
Dept: SURGERY | Facility: HOSPITAL | Age: 57
End: 2022-10-27
Payer: COMMERCIAL

## 2022-10-27 ENCOUNTER — ANESTHESIA EVENT (OUTPATIENT)
Dept: SURGERY | Facility: HOSPITAL | Age: 57
End: 2022-10-27
Payer: COMMERCIAL

## 2022-10-27 ENCOUNTER — HOSPITAL ENCOUNTER (OUTPATIENT)
Facility: HOSPITAL | Age: 57
Setting detail: HOSPITAL OUTPATIENT SURGERY
Discharge: HOME | End: 2022-10-27
Attending: ORTHOPAEDIC SURGERY | Admitting: ORTHOPAEDIC SURGERY
Payer: COMMERCIAL

## 2022-10-27 VITALS
TEMPERATURE: 97 F | WEIGHT: 186 LBS | OXYGEN SATURATION: 100 % | BODY MASS INDEX: 23.87 KG/M2 | HEART RATE: 66 BPM | RESPIRATION RATE: 16 BRPM | DIASTOLIC BLOOD PRESSURE: 77 MMHG | SYSTOLIC BLOOD PRESSURE: 130 MMHG | HEIGHT: 74 IN

## 2022-10-27 DIAGNOSIS — Z48.89 AFTERCARE FOLLOWING SURGERY: ICD-10-CM

## 2022-10-27 DIAGNOSIS — Z01.812 ENCOUNTER FOR PREPROCEDURE SCREENING LABORATORY TESTING FOR COVID-19: Primary | ICD-10-CM

## 2022-10-27 DIAGNOSIS — Z20.822 ENCOUNTER FOR PREPROCEDURE SCREENING LABORATORY TESTING FOR COVID-19: Primary | ICD-10-CM

## 2022-10-27 PROCEDURE — 0LUD0JZ SUPPLEMENT LEFT THORAX TENDON WITH SYNTHETIC SUBSTITUTE, OPEN APPROACH: ICD-10-PCS | Performed by: ORTHOPAEDIC SURGERY

## 2022-10-27 PROCEDURE — 76942 ECHO GUIDE FOR BIOPSY: CPT | Performed by: ANESTHESIOLOGY

## 2022-10-27 PROCEDURE — 0RBK4ZZ EXCISION OF LEFT SHOULDER JOINT, PERCUTANEOUS ENDOSCOPIC APPROACH: ICD-10-PCS | Performed by: ORTHOPAEDIC SURGERY

## 2022-10-27 DEVICE — LARGE PEC BUTTON KIT
Type: IMPLANTABLE DEVICE | Site: SHOULDER | Status: FUNCTIONAL
Brand: ARTHREX®

## 2022-10-27 RX ORDER — MIDAZOLAM HYDROCHLORIDE 1 MG/ML
INJECTION INTRAMUSCULAR; INTRAVENOUS AS NEEDED
Status: DISCONTINUED | OUTPATIENT
Start: 2022-10-27 | End: 2022-10-27 | Stop reason: SURG

## 2022-10-27 RX ORDER — ROPIVACAINE HYDROCHLORIDE 5 MG/ML
INJECTION, SOLUTION EPIDURAL; INFILTRATION; PERINEURAL AS NEEDED
Status: DISCONTINUED | OUTPATIENT
Start: 2022-10-27 | End: 2022-10-27 | Stop reason: SURG

## 2022-10-27 RX ORDER — SODIUM CHLORIDE, SODIUM LACTATE, POTASSIUM CHLORIDE, CALCIUM CHLORIDE 600; 310; 30; 20 MG/100ML; MG/100ML; MG/100ML; MG/100ML
INJECTION, SOLUTION INTRAVENOUS CONTINUOUS
Status: DISCONTINUED | OUTPATIENT
Start: 2022-10-27 | End: 2022-10-27

## 2022-10-27 RX ORDER — ONDANSETRON 2 MG/ML
INJECTION INTRAMUSCULAR; INTRAVENOUS AS NEEDED
Status: DISCONTINUED | OUTPATIENT
Start: 2022-10-27 | End: 2022-10-27 | Stop reason: SURG

## 2022-10-27 RX ORDER — HYDROMORPHONE HYDROCHLORIDE 1 MG/ML
0.2 INJECTION, SOLUTION INTRAMUSCULAR; INTRAVENOUS; SUBCUTANEOUS EVERY 5 MIN PRN
Status: DISCONTINUED | OUTPATIENT
Start: 2022-10-27 | End: 2022-10-27

## 2022-10-27 RX ORDER — DEXAMETHASONE SODIUM PHOSPHATE 10 MG/ML
INJECTION, SOLUTION INTRAMUSCULAR; INTRAVENOUS AS NEEDED
Status: DISCONTINUED | OUTPATIENT
Start: 2022-10-27 | End: 2022-10-27 | Stop reason: SURG

## 2022-10-27 RX ORDER — TRANEXAMIC ACID 10 MG/ML
1000 INJECTION, SOLUTION INTRAVENOUS ONCE
Status: COMPLETED | OUTPATIENT
Start: 2022-10-27 | End: 2022-10-27

## 2022-10-27 RX ORDER — DIPHENHYDRAMINE HYDROCHLORIDE 50 MG/ML
12.5 INJECTION INTRAMUSCULAR; INTRAVENOUS AS NEEDED
Status: DISCONTINUED | OUTPATIENT
Start: 2022-10-27 | End: 2022-10-27

## 2022-10-27 RX ORDER — CEFAZOLIN SODIUM/WATER 2 G/20 ML
2 SYRINGE (ML) INTRAVENOUS ONCE
Status: COMPLETED | OUTPATIENT
Start: 2022-10-27 | End: 2022-10-27

## 2022-10-27 RX ORDER — LIDOCAINE HYDROCHLORIDE 10 MG/ML
INJECTION, SOLUTION EPIDURAL; INFILTRATION; INTRACAUDAL; PERINEURAL AS NEEDED
Status: DISCONTINUED | OUTPATIENT
Start: 2022-10-27 | End: 2022-10-27 | Stop reason: SURG

## 2022-10-27 RX ORDER — MIDAZOLAM HYDROCHLORIDE 1 MG/ML
1 INJECTION INTRAMUSCULAR; INTRAVENOUS EVERY 5 MIN PRN
Status: DISCONTINUED | OUTPATIENT
Start: 2022-10-27 | End: 2022-10-27

## 2022-10-27 RX ORDER — METOCLOPRAMIDE HYDROCHLORIDE 5 MG/ML
INJECTION INTRAMUSCULAR; INTRAVENOUS AS NEEDED
Status: DISCONTINUED | OUTPATIENT
Start: 2022-10-27 | End: 2022-10-27 | Stop reason: SURG

## 2022-10-27 RX ORDER — ONDANSETRON 2 MG/ML
4 INJECTION INTRAMUSCULAR; INTRAVENOUS EVERY 6 HOURS PRN
Status: DISCONTINUED | OUTPATIENT
Start: 2022-10-27 | End: 2022-10-27

## 2022-10-27 RX ORDER — HYDROCODONE BITARTRATE AND ACETAMINOPHEN 5; 325 MG/1; MG/1
2 TABLET ORAL ONCE AS NEEDED
Status: DISCONTINUED | OUTPATIENT
Start: 2022-10-27 | End: 2022-10-27

## 2022-10-27 RX ORDER — ACETAMINOPHEN 500 MG
1000 TABLET ORAL ONCE AS NEEDED
Status: DISCONTINUED | OUTPATIENT
Start: 2022-10-27 | End: 2022-10-27

## 2022-10-27 RX ORDER — KETOROLAC TROMETHAMINE 30 MG/ML
INJECTION, SOLUTION INTRAMUSCULAR; INTRAVENOUS AS NEEDED
Status: DISCONTINUED | OUTPATIENT
Start: 2022-10-27 | End: 2022-10-27 | Stop reason: SURG

## 2022-10-27 RX ORDER — CEFAZOLIN SODIUM/WATER 2 G/20 ML
SYRINGE (ML) INTRAVENOUS
Status: DISCONTINUED
Start: 2022-10-27 | End: 2022-10-27

## 2022-10-27 RX ORDER — ACETAMINOPHEN 500 MG
1000 TABLET ORAL ONCE
Status: DISCONTINUED | OUTPATIENT
Start: 2022-10-27 | End: 2022-10-27 | Stop reason: HOSPADM

## 2022-10-27 RX ORDER — EPHEDRINE SULFATE 50 MG/ML
INJECTION INTRAVENOUS AS NEEDED
Status: DISCONTINUED | OUTPATIENT
Start: 2022-10-27 | End: 2022-10-27 | Stop reason: SURG

## 2022-10-27 RX ORDER — NALOXONE HYDROCHLORIDE 0.4 MG/ML
80 INJECTION, SOLUTION INTRAMUSCULAR; INTRAVENOUS; SUBCUTANEOUS AS NEEDED
Status: DISCONTINUED | OUTPATIENT
Start: 2022-10-27 | End: 2022-10-27

## 2022-10-27 RX ORDER — NEOSTIGMINE METHYLSULFATE 1 MG/ML
INJECTION, SOLUTION INTRAVENOUS AS NEEDED
Status: DISCONTINUED | OUTPATIENT
Start: 2022-10-27 | End: 2022-10-27 | Stop reason: SURG

## 2022-10-27 RX ORDER — MEPERIDINE HYDROCHLORIDE 25 MG/ML
12.5 INJECTION INTRAMUSCULAR; INTRAVENOUS; SUBCUTANEOUS AS NEEDED
Status: DISCONTINUED | OUTPATIENT
Start: 2022-10-27 | End: 2022-10-27

## 2022-10-27 RX ORDER — HYDROMORPHONE HYDROCHLORIDE 1 MG/ML
0.4 INJECTION, SOLUTION INTRAMUSCULAR; INTRAVENOUS; SUBCUTANEOUS EVERY 5 MIN PRN
Status: DISCONTINUED | OUTPATIENT
Start: 2022-10-27 | End: 2022-10-27

## 2022-10-27 RX ORDER — HYDROCODONE BITARTRATE AND ACETAMINOPHEN 5; 325 MG/1; MG/1
1 TABLET ORAL ONCE AS NEEDED
Status: DISCONTINUED | OUTPATIENT
Start: 2022-10-27 | End: 2022-10-27

## 2022-10-27 RX ORDER — ROCURONIUM BROMIDE 10 MG/ML
INJECTION, SOLUTION INTRAVENOUS AS NEEDED
Status: DISCONTINUED | OUTPATIENT
Start: 2022-10-27 | End: 2022-10-27 | Stop reason: SURG

## 2022-10-27 RX ORDER — GLYCOPYRROLATE 0.2 MG/ML
INJECTION, SOLUTION INTRAMUSCULAR; INTRAVENOUS AS NEEDED
Status: DISCONTINUED | OUTPATIENT
Start: 2022-10-27 | End: 2022-10-27 | Stop reason: SURG

## 2022-10-27 RX ORDER — PHENYLEPHRINE HCL 10 MG/ML
VIAL (ML) INJECTION AS NEEDED
Status: DISCONTINUED | OUTPATIENT
Start: 2022-10-27 | End: 2022-10-27 | Stop reason: SURG

## 2022-10-27 RX ORDER — DEXAMETHASONE SODIUM PHOSPHATE 4 MG/ML
VIAL (ML) INJECTION AS NEEDED
Status: DISCONTINUED | OUTPATIENT
Start: 2022-10-27 | End: 2022-10-27 | Stop reason: SURG

## 2022-10-27 RX ORDER — HYDROMORPHONE HYDROCHLORIDE 1 MG/ML
0.6 INJECTION, SOLUTION INTRAMUSCULAR; INTRAVENOUS; SUBCUTANEOUS EVERY 5 MIN PRN
Status: DISCONTINUED | OUTPATIENT
Start: 2022-10-27 | End: 2022-10-27

## 2022-10-27 RX ADMIN — PHENYLEPHRINE HCL 100 MCG: 10 MG/ML VIAL (ML) INJECTION at 13:29:00

## 2022-10-27 RX ADMIN — MIDAZOLAM HYDROCHLORIDE 2 MG: 1 INJECTION INTRAMUSCULAR; INTRAVENOUS at 12:57:00

## 2022-10-27 RX ADMIN — PHENYLEPHRINE HCL 100 MCG: 10 MG/ML VIAL (ML) INJECTION at 13:26:00

## 2022-10-27 RX ADMIN — ROPIVACAINE HYDROCHLORIDE 20 ML: 5 INJECTION, SOLUTION EPIDURAL; INFILTRATION; PERINEURAL at 13:02:00

## 2022-10-27 RX ADMIN — GLYCOPYRROLATE 0.4 MG: 0.2 INJECTION, SOLUTION INTRAMUSCULAR; INTRAVENOUS at 15:05:00

## 2022-10-27 RX ADMIN — ROPIVACAINE HYDROCHLORIDE 20 ML: 5 INJECTION, SOLUTION EPIDURAL; INFILTRATION; PERINEURAL at 13:06:00

## 2022-10-27 RX ADMIN — EPHEDRINE SULFATE 5 MG: 50 INJECTION INTRAVENOUS at 13:31:00

## 2022-10-27 RX ADMIN — LIDOCAINE HYDROCHLORIDE 50 MG: 10 INJECTION, SOLUTION EPIDURAL; INFILTRATION; INTRACAUDAL; PERINEURAL at 13:07:00

## 2022-10-27 RX ADMIN — DEXAMETHASONE SODIUM PHOSPHATE 2 MG: 10 INJECTION, SOLUTION INTRAMUSCULAR; INTRAVENOUS at 13:02:00

## 2022-10-27 RX ADMIN — DEXAMETHASONE SODIUM PHOSPHATE 2 MG: 10 INJECTION, SOLUTION INTRAMUSCULAR; INTRAVENOUS at 13:06:00

## 2022-10-27 RX ADMIN — CEFAZOLIN SODIUM/WATER 2 G: 2 G/20 ML SYRINGE (ML) INTRAVENOUS at 13:15:00

## 2022-10-27 RX ADMIN — SODIUM CHLORIDE, SODIUM LACTATE, POTASSIUM CHLORIDE, CALCIUM CHLORIDE: 600; 310; 30; 20 INJECTION, SOLUTION INTRAVENOUS at 15:42:00

## 2022-10-27 RX ADMIN — TRANEXAMIC ACID 1000 MG: 10 INJECTION, SOLUTION INTRAVENOUS at 13:16:00

## 2022-10-27 RX ADMIN — ONDANSETRON 4 MG: 2 INJECTION INTRAMUSCULAR; INTRAVENOUS at 15:05:00

## 2022-10-27 RX ADMIN — NEOSTIGMINE METHYLSULFATE 3 MG: 1 INJECTION, SOLUTION INTRAVENOUS at 15:05:00

## 2022-10-27 RX ADMIN — KETOROLAC TROMETHAMINE 30 MG: 30 INJECTION, SOLUTION INTRAMUSCULAR; INTRAVENOUS at 15:05:00

## 2022-10-27 RX ADMIN — EPHEDRINE SULFATE 5 MG: 50 INJECTION INTRAVENOUS at 13:45:00

## 2022-10-27 RX ADMIN — MIDAZOLAM HYDROCHLORIDE 2 MG: 1 INJECTION INTRAMUSCULAR; INTRAVENOUS at 12:55:00

## 2022-10-27 RX ADMIN — METOCLOPRAMIDE HYDROCHLORIDE 10 MG: 5 INJECTION INTRAMUSCULAR; INTRAVENOUS at 13:24:00

## 2022-10-27 RX ADMIN — EPHEDRINE SULFATE 5 MG: 50 INJECTION INTRAVENOUS at 13:26:00

## 2022-10-27 RX ADMIN — ROCURONIUM BROMIDE 40 MG: 10 INJECTION, SOLUTION INTRAVENOUS at 13:07:00

## 2022-10-27 RX ADMIN — PHENYLEPHRINE HCL 100 MCG: 10 MG/ML VIAL (ML) INJECTION at 13:37:00

## 2022-10-27 RX ADMIN — DEXAMETHASONE SODIUM PHOSPHATE 4 MG: 4 MG/ML VIAL (ML) INJECTION at 13:24:00

## 2022-10-27 NOTE — OR NURSING
Instructions given. Iv removed. Drugs delivered x4  Sling in place. Awake and alert. Denies pain or nausea.

## 2022-10-27 NOTE — ANESTHESIA PROCEDURE NOTES
Regional Block    Date/Time: 10/27/2022 1:02 PM  Performed by: Foreign Brewster MD  Authorized by: Foreign Brewster MD       General Information and Staff    Start Time:  10/27/2022 1:02 PM  End Time:  10/27/2022 1:06 PM  Anesthesiologist:  Foreign Brewster MD  Performed by: Anesthesiologist  Patient Location:  OR    Block Placement: Pre Induction  Site Identification: real time ultrasound guided and image stored and retrievable    Block site/laterality marked before start: site marked  Reason for Block: at surgeon's request and post-op pain management    Preanesthetic Checklist: 2 patient identifers, IV checked, site marked, risks and benefits discussed, monitors and equipment checked, pre-op evaluation, timeout performed, anesthesia consent, sterile technique used, no prohibitive neurological deficits and no local skin infection at insertion site      Procedure Details    Patient Position:  Supine  Prep: ChloraPrep    Monitoring:  Cardiac monitor, continuous pulse ox and blood pressure cuff  Block Type: Interscalene  Laterality:  Left  Injection Technique:  Single-shot    Needle    Needle Type:  Short-bevel and echogenic  Needle Gauge:  21 G  Needle Length:  100 mm  Needle Localization:  Ultrasound guidance and nerve stimulator  Reason for Ultrasound Use: appropriate spread of the medication was noted in real time and no ultrasound evidence of intravascular and/or intraneural injection    Nerve Stimulator: 0.5 amps  Muscle Twitch Response: deltoid response      Assessment    Injection Assessment:  Good spread noted, negative resistance, negative aspiration for heme, incremental injection, low pressure, local visualized surrounding nerve on ultrasound and no pain on injection  Heart Rate Change: No    - Patient tolerated block procedure well without evidence of immediate block related complications.      Medications  10/27/2022 1:02 PM      Additional Comments    Medication:  Ropivacaine 0.5% 20mL + dexa PF 2mg

## 2022-10-27 NOTE — ANESTHESIA PROCEDURE NOTES
Regional Block    Date/Time: 10/27/2022 1:02 PM  Performed by: Imelda Madrid MD  Authorized by: Imelda Madrid MD       General Information and Staff    Start Time:  10/27/2022 1:02 PM  End Time:  10/27/2022 1:06 PM  Anesthesiologist:  Imelda Madrid MD  Performed by: Anesthesiologist  Patient Location:  OR    Block Placement: Pre Induction  Site Identification: real time ultrasound guided and image stored and retrievable    Block site/laterality marked before start: site marked  Reason for Block: at surgeon's request and post-op pain management    Preanesthetic Checklist: 2 patient identifers, IV checked, site marked, risks and benefits discussed, monitors and equipment checked, pre-op evaluation, timeout performed, anesthesia consent, sterile technique used, no prohibitive neurological deficits and no local skin infection at insertion site      Procedure Details    Patient Position:  Supine  Prep: ChloraPrep    Monitoring:  Cardiac monitor, continuous pulse ox and blood pressure cuff  Block Type:  PEC1  Laterality:  Left  Injection Technique:  Single-shot    Needle    Needle Type:  Short-bevel and echogenic  Needle Gauge:  21 G  Needle Length:  100 mm  Needle Localization:  Ultrasound guidance  Reason for Ultrasound Use: appropriate spread of the medication was noted in real time and no ultrasound evidence of intravascular and/or intraneural injection            Assessment    Injection Assessment:  Good spread noted, negative resistance, negative aspiration for heme, incremental injection and low pressure  Heart Rate Change: No    - Patient tolerated block procedure well without evidence of immediate block related complications.      Medications  10/27/2022 1:02 PM      Additional Comments    Medication:  Ropivacaine 0.5% 20mL + dexa PF 2mg

## 2022-10-27 NOTE — ANESTHESIA PROCEDURE NOTES
Airway  Date/Time: 10/27/2022 1:09 PM  Urgency: elective      General Information and Staff    Patient location during procedure: OR  Anesthesiologist: Carolyn Pate MD  Performed: anesthesiologist     Indications and Patient Condition  Indications for airway management: anesthesia  Sedation level: deep  Preoxygenated: yes  Patient position: sniffing  Mask difficulty assessment: 1 - vent by mask    Final Airway Details  Final airway type: endotracheal airway      Successful airway: ETT  Cuffed: yes   Successful intubation technique: direct laryngoscopy  Endotracheal tube insertion site: oral  Blade: Gianni  Blade size: #3  ETT size (mm): 7.5    Cormack-Lehane Classification: grade I - full view of glottis  Placement verified by: chest auscultation and capnometry   Cuff volume (mL): 8  Measured from: lips  ETT to lips (cm): 22  Number of attempts at approach: 1

## 2022-10-28 NOTE — BRIEF OP NOTE
Pre-Operative Diagnosis: Rupture of pectoralis major muscle, initial encounter [S29.011A]     Post-Operative Diagnosis: Rupture of pectoralis major muscle, initial encounter [S29.011A]      Procedure Performed:   LEFT SHOULDER ARTHROSCOPY SUBACROMIAL DECOMPRESSION, PECTORALIS TENDON REPAIR. Surgeon(s) and Role:     * Bud Tiwari MD - Primary    Assistant(s):  Surgical Assistant.: Meenu Salinas-a Sol Ballard     Surgical Findings: Mild subacromial bursitis, debrided with maintenance of hemostasis. Open deltopectoral approach utilized to slightly medial in the mid axillary crease to identify pectoralis major rupture and repair to the lateral aspect of the intertubercular sulcus on the humerus. 3 cortical buttons with fiber tape utilized for excellent apposition and repair of the pectoralis major tendon. Adequate hemostasis at conclusion of procedure.       Specimen: Not applicable     Estimated Blood Loss: Blood Output: 50 mL (10/27/2022  2:53 PM)      Dictation Number:  None    Yue Bourgeois MD  10/27/2022  7:40 PM

## 2022-11-04 ENCOUNTER — OFFICE VISIT (OUTPATIENT)
Dept: ORTHOPEDICS CLINIC | Facility: CLINIC | Age: 57
End: 2022-11-04
Payer: COMMERCIAL

## 2022-11-04 DIAGNOSIS — S29.011A RUPTURE OF PECTORALIS MAJOR MUSCLE, INITIAL ENCOUNTER: Primary | ICD-10-CM

## 2022-11-04 DIAGNOSIS — Z48.89 AFTERCARE FOLLOWING SURGERY: ICD-10-CM

## 2022-11-04 PROCEDURE — 99024 POSTOP FOLLOW-UP VISIT: CPT | Performed by: PHYSICIAN ASSISTANT

## 2022-11-18 ENCOUNTER — OFFICE VISIT (OUTPATIENT)
Dept: ORTHOPEDICS CLINIC | Facility: CLINIC | Age: 57
End: 2022-11-18
Payer: COMMERCIAL

## 2022-11-18 DIAGNOSIS — Z48.89 AFTERCARE FOLLOWING SURGERY: ICD-10-CM

## 2022-11-18 DIAGNOSIS — S29.011A RUPTURE OF PECTORALIS MAJOR MUSCLE, INITIAL ENCOUNTER: Primary | ICD-10-CM

## 2022-11-18 PROCEDURE — 99024 POSTOP FOLLOW-UP VISIT: CPT | Performed by: PHYSICIAN ASSISTANT

## 2022-11-21 ENCOUNTER — TELEPHONE (OUTPATIENT)
Dept: INTERNAL MEDICINE CLINIC | Facility: CLINIC | Age: 57
End: 2022-11-21

## 2022-11-21 DIAGNOSIS — Z23 NEED FOR SHINGLES VACCINE: Primary | ICD-10-CM

## 2022-11-21 DIAGNOSIS — Z00.00 ROUTINE GENERAL MEDICAL EXAMINATION AT A HEALTH CARE FACILITY: ICD-10-CM

## 2022-11-21 DIAGNOSIS — Z13.220 SCREENING FOR LIPID DISORDERS: ICD-10-CM

## 2022-11-21 DIAGNOSIS — Z12.5 SCREENING FOR MALIGNANT NEOPLASM OF PROSTATE: ICD-10-CM

## 2022-11-21 DIAGNOSIS — Z13.29 SCREENING FOR THYROID DISORDER: ICD-10-CM

## 2022-11-21 DIAGNOSIS — Z13.228 SCREENING FOR METABOLIC DISORDER: ICD-10-CM

## 2022-11-21 DIAGNOSIS — Z13.0 SCREENING FOR DISORDER OF BLOOD AND BLOOD-FORMING ORGANS: ICD-10-CM

## 2022-11-21 NOTE — TELEPHONE ENCOUNTER
Orders to [facility] Pt aware to get labs done no sooner than 2 weeks prior to the appt. Pt aware to fast.  No call back required.     Future Appointments   Date Time Provider Whitley Ana   4/28/2023  8:30 AM Samuel Castro MD [facility]

## 2022-11-21 NOTE — TELEPHONE ENCOUNTER
Pt scheduled on below for 2nd shingles shot    Future Appointments   Date Time Provider Whitley Perez   11/22/2022  8:45 AM [facility] NURSE [facility]

## 2022-11-22 ENCOUNTER — NURSE ONLY (OUTPATIENT)
Dept: INTERNAL MEDICINE CLINIC | Facility: CLINIC | Age: 57
End: 2022-11-22
Payer: COMMERCIAL

## 2022-11-22 PROCEDURE — 90471 IMMUNIZATION ADMIN: CPT | Performed by: INTERNAL MEDICINE

## 2022-11-22 PROCEDURE — 90750 HZV VACC RECOMBINANT IM: CPT | Performed by: INTERNAL MEDICINE

## 2022-11-22 NOTE — PROCEDURES
Shingrix consent and ABN signed by patient ; vaccine administered and tolerated well by patient. VIS sheet given to patient. No further vaccine needed as patient has received both recommended Shingrix vaccines. All patient questions have been answered.

## 2022-11-28 ENCOUNTER — ORDER TRANSCRIPTION (OUTPATIENT)
Dept: ADMINISTRATIVE | Facility: HOSPITAL | Age: 57
End: 2022-11-28

## 2022-11-28 DIAGNOSIS — Z13.6 ENCOUNTER FOR SPECIAL SCREENING EXAMINATION FOR CARDIOVASCULAR DISORDER: Primary | ICD-10-CM

## 2022-12-03 ENCOUNTER — HOSPITAL ENCOUNTER (OUTPATIENT)
Dept: CT IMAGING | Age: 57
Discharge: HOME | End: 2022-12-03
Attending: INTERNAL MEDICINE

## 2022-12-03 DIAGNOSIS — Z13.6 ENCOUNTER FOR SPECIAL SCREENING EXAMINATION FOR CARDIOVASCULAR DISORDER: ICD-10-CM

## 2022-12-15 ENCOUNTER — TELEPHONE (OUTPATIENT)
Dept: INTERNAL MEDICINE CLINIC | Facility: CLINIC | Age: 57
End: 2022-12-15

## 2022-12-15 DIAGNOSIS — J47.9 BRONCHIECTASIS WITHOUT COMPLICATION (HCC): Primary | ICD-10-CM

## 2022-12-16 ENCOUNTER — OFFICE VISIT (OUTPATIENT)
Dept: ORTHOPEDICS CLINIC | Facility: CLINIC | Age: 57
End: 2022-12-16
Payer: COMMERCIAL

## 2022-12-16 DIAGNOSIS — S29.011D RUPTURE OF PECTORALIS MAJOR MUSCLE, SUBSEQUENT ENCOUNTER: Primary | ICD-10-CM

## 2022-12-16 PROCEDURE — 99024 POSTOP FOLLOW-UP VISIT: CPT | Performed by: PHYSICIAN ASSISTANT

## 2023-01-28 ENCOUNTER — HOSPITAL ENCOUNTER (OUTPATIENT)
Dept: GENERAL RADIOLOGY | Age: 58
Discharge: HOME | End: 2023-01-28
Attending: INTERNAL MEDICINE
Payer: COMMERCIAL

## 2023-01-28 DIAGNOSIS — J47.9 BRONCHIECTASIS WITHOUT COMPLICATION (HCC): ICD-10-CM

## 2023-01-28 PROCEDURE — 71046 X-RAY EXAM CHEST 2 VIEWS: CPT | Performed by: INTERNAL MEDICINE

## 2023-03-20 ENCOUNTER — PATIENT MESSAGE (OUTPATIENT)
Dept: ORTHOPEDICS CLINIC | Facility: CLINIC | Age: 58
End: 2023-03-20

## 2023-03-22 NOTE — TELEPHONE ENCOUNTER
From: Roya Mack  Sent: 3/22/2023 7:35 AM CDT  To: [facility] Clinical Pool  Subject: Missed Appointment 2/20    Hi,     It appears that 3/26 is Sunday. Is this correct? Also, do you have any appointments that are later during the day?     Thanks

## 2023-04-10 ENCOUNTER — OFFICE VISIT (OUTPATIENT)
Dept: ORTHOPEDICS CLINIC | Facility: CLINIC | Age: 58
End: 2023-04-10
Payer: COMMERCIAL

## 2023-04-10 DIAGNOSIS — Z48.89 AFTERCARE FOLLOWING SURGERY: Primary | ICD-10-CM

## 2023-04-10 DIAGNOSIS — S29.011D RUPTURE OF PECTORALIS MAJOR MUSCLE, SUBSEQUENT ENCOUNTER: ICD-10-CM

## 2023-04-10 PROCEDURE — 99213 OFFICE O/P EST LOW 20 MIN: CPT | Performed by: ORTHOPAEDIC SURGERY

## 2023-04-24 ENCOUNTER — PATIENT MESSAGE (OUTPATIENT)
Dept: INTERNAL MEDICINE CLINIC | Facility: CLINIC | Age: 58
End: 2023-04-24

## 2023-04-25 ENCOUNTER — LAB ENCOUNTER (OUTPATIENT)
Dept: LAB | Age: 58
End: 2023-04-25
Attending: INTERNAL MEDICINE
Payer: COMMERCIAL

## 2023-04-25 DIAGNOSIS — Z13.220 SCREENING FOR LIPID DISORDERS: ICD-10-CM

## 2023-04-25 DIAGNOSIS — Z13.29 SCREENING FOR THYROID DISORDER: ICD-10-CM

## 2023-04-25 DIAGNOSIS — Z13.228 SCREENING FOR METABOLIC DISORDER: ICD-10-CM

## 2023-04-25 DIAGNOSIS — Z13.0 SCREENING FOR DISORDER OF BLOOD AND BLOOD-FORMING ORGANS: ICD-10-CM

## 2023-04-25 DIAGNOSIS — Z12.5 SCREENING FOR MALIGNANT NEOPLASM OF PROSTATE: ICD-10-CM

## 2023-04-25 DIAGNOSIS — Z00.00 ROUTINE GENERAL MEDICAL EXAMINATION AT A HEALTH CARE FACILITY: ICD-10-CM

## 2023-04-25 LAB
ALBUMIN SERPL-MCNC: 4.1 G/DL (ref 3.4–5)
ALBUMIN/GLOB SERPL: 1.2 {RATIO} (ref 1–2)
ALP LIVER SERPL-CCNC: 60 U/L
ALT SERPL-CCNC: 26 U/L
ANION GAP SERPL CALC-SCNC: 3 MMOL/L (ref 0–18)
AST SERPL-CCNC: 27 U/L (ref 15–37)
BASOPHILS # BLD AUTO: 0.04 X10(3) UL (ref 0–0.2)
BASOPHILS NFR BLD AUTO: 0.9 %
BILIRUB SERPL-MCNC: 0.7 MG/DL (ref 0.1–2)
BUN BLD-MCNC: 19 MG/DL (ref 7–18)
CALCIUM BLD-MCNC: 9.5 MG/DL (ref 8.5–10.1)
CHLORIDE SERPL-SCNC: 106 MMOL/L (ref 98–112)
CHOLEST SERPL-MCNC: 188 MG/DL (ref ?–200)
CO2 SERPL-SCNC: 30 MMOL/L (ref 21–32)
COMPLEXED PSA SERPL-MCNC: 4.85 NG/ML (ref ?–4)
CREAT BLD-MCNC: 1.27 MG/DL
EOSINOPHIL # BLD AUTO: 0.13 X10(3) UL (ref 0–0.7)
EOSINOPHIL NFR BLD AUTO: 3 %
ERYTHROCYTE [DISTWIDTH] IN BLOOD BY AUTOMATED COUNT: 13.3 %
FASTING PATIENT LIPID ANSWER: YES
FASTING STATUS PATIENT QL REPORTED: YES
GFR SERPLBLD BASED ON 1.73 SQ M-ARVRAT: 66 ML/MIN/1.73M2 (ref 60–?)
GLOBULIN PLAS-MCNC: 3.3 G/DL (ref 2.8–4.4)
GLUCOSE BLD-MCNC: 97 MG/DL (ref 70–99)
HCT VFR BLD AUTO: 48.7 %
HDLC SERPL-MCNC: 65 MG/DL (ref 40–59)
HGB BLD-MCNC: 15.7 G/DL
IMM GRANULOCYTES # BLD AUTO: 0.01 X10(3) UL (ref 0–1)
IMM GRANULOCYTES NFR BLD: 0.2 %
LDLC SERPL CALC-MCNC: 108 MG/DL (ref ?–100)
LYMPHOCYTES # BLD AUTO: 2.06 X10(3) UL (ref 1–4)
LYMPHOCYTES NFR BLD AUTO: 47.6 %
MCH RBC QN AUTO: 27 PG (ref 26–34)
MCHC RBC AUTO-ENTMCNC: 32.2 G/DL (ref 31–37)
MCV RBC AUTO: 83.7 FL
MONOCYTES # BLD AUTO: 0.42 X10(3) UL (ref 0.1–1)
MONOCYTES NFR BLD AUTO: 9.7 %
NEUTROPHILS # BLD AUTO: 1.67 X10 (3) UL (ref 1.5–7.7)
NEUTROPHILS # BLD AUTO: 1.67 X10(3) UL (ref 1.5–7.7)
NEUTROPHILS NFR BLD AUTO: 38.6 %
NONHDLC SERPL-MCNC: 123 MG/DL (ref ?–130)
OSMOLALITY SERPL CALC.SUM OF ELEC: 290 MOSM/KG (ref 275–295)
PLATELET # BLD AUTO: 249 10(3)UL (ref 150–450)
POTASSIUM SERPL-SCNC: 4.4 MMOL/L (ref 3.5–5.1)
PROT SERPL-MCNC: 7.4 G/DL (ref 6.4–8.2)
RBC # BLD AUTO: 5.82 X10(6)UL
SODIUM SERPL-SCNC: 139 MMOL/L (ref 136–145)
TRIGL SERPL-MCNC: 84 MG/DL (ref 30–149)
TSI SER-ACNC: 3.03 MIU/ML (ref 0.36–3.74)
VLDLC SERPL CALC-MCNC: 14 MG/DL (ref 0–30)
WBC # BLD AUTO: 4.3 X10(3) UL (ref 4–11)

## 2023-04-25 PROCEDURE — 84443 ASSAY THYROID STIM HORMONE: CPT

## 2023-04-25 PROCEDURE — 80061 LIPID PANEL: CPT

## 2023-04-25 PROCEDURE — 85025 COMPLETE CBC W/AUTO DIFF WBC: CPT

## 2023-04-25 PROCEDURE — 36415 COLL VENOUS BLD VENIPUNCTURE: CPT

## 2023-04-25 PROCEDURE — 80053 COMPREHEN METABOLIC PANEL: CPT

## 2023-04-26 NOTE — TELEPHONE ENCOUNTER
From: Colette Mckinney  To: Will Castañeda MD  Sent: 4/24/2023 1:39 PM CDT  Subject: Labs for upcoming physical     Hello,    Should we schedule labs prior to my upcoming physical?    Thanks,  Geoff.

## 2023-05-08 ENCOUNTER — OFFICE VISIT (OUTPATIENT)
Dept: INTERNAL MEDICINE CLINIC | Facility: CLINIC | Age: 58
End: 2023-05-08
Payer: COMMERCIAL

## 2023-05-08 VITALS
DIASTOLIC BLOOD PRESSURE: 84 MMHG | RESPIRATION RATE: 18 BRPM | HEIGHT: 74 IN | BODY MASS INDEX: 24.82 KG/M2 | SYSTOLIC BLOOD PRESSURE: 120 MMHG | WEIGHT: 193.38 LBS

## 2023-05-08 DIAGNOSIS — Z00.00 PE (PHYSICAL EXAM), ANNUAL: Primary | ICD-10-CM

## 2023-05-08 DIAGNOSIS — R97.20 ELEVATED PSA: ICD-10-CM

## 2023-05-08 PROCEDURE — 3079F DIAST BP 80-89 MM HG: CPT | Performed by: INTERNAL MEDICINE

## 2023-05-08 PROCEDURE — 3074F SYST BP LT 130 MM HG: CPT | Performed by: INTERNAL MEDICINE

## 2023-05-08 PROCEDURE — 99396 PREV VISIT EST AGE 40-64: CPT | Performed by: INTERNAL MEDICINE

## 2023-05-08 PROCEDURE — 3008F BODY MASS INDEX DOCD: CPT | Performed by: INTERNAL MEDICINE

## 2023-12-08 ENCOUNTER — TELEPHONE (OUTPATIENT)
Dept: INTERNAL MEDICINE CLINIC | Facility: CLINIC | Age: 58
End: 2023-12-08

## 2023-12-08 NOTE — TELEPHONE ENCOUNTER
PSA last completed 4/25/23. There is an order in system for rpt. AS, when would you like pt to complete rpt?

## 2023-12-08 NOTE — TELEPHONE ENCOUNTER
Pt called to sched cpe and stated he thinks he has PSA to re do? ? When was that supposed to be done?  Please let him know

## 2023-12-08 NOTE — TELEPHONE ENCOUNTER
Future Appointments   Date Time Provider Department Center   5/17/2024  8:30 AM Schriedel, Adam, MD [facility]     Orders to edward- Pt informed that labs need to be completed no sooner than 2 weeks prior to the appt. Pt aware to fast-no call back required

## 2023-12-16 ENCOUNTER — LAB ENCOUNTER (OUTPATIENT)
Dept: LAB | Age: 58
End: 2023-12-16
Attending: INTERNAL MEDICINE
Payer: COMMERCIAL

## 2023-12-16 DIAGNOSIS — R97.20 ELEVATED PSA: ICD-10-CM

## 2023-12-16 LAB — PSA SERPL-MCNC: 7.22 NG/ML (ref ?–4)

## 2023-12-16 PROCEDURE — 84153 ASSAY OF PSA TOTAL: CPT

## 2023-12-16 PROCEDURE — 36415 COLL VENOUS BLD VENIPUNCTURE: CPT

## 2023-12-27 ENCOUNTER — OFFICE VISIT (OUTPATIENT)
Dept: SURGERY | Facility: CLINIC | Age: 58
End: 2023-12-27
Payer: COMMERCIAL

## 2023-12-27 ENCOUNTER — LAB ENCOUNTER (OUTPATIENT)
Dept: LAB | Facility: HOSPITAL | Age: 58
End: 2023-12-27
Attending: UROLOGY
Payer: COMMERCIAL

## 2023-12-27 ENCOUNTER — PATIENT MESSAGE (OUTPATIENT)
Dept: SURGERY | Facility: CLINIC | Age: 58
End: 2023-12-27

## 2023-12-27 DIAGNOSIS — R35.0 URINARY FREQUENCY: ICD-10-CM

## 2023-12-27 DIAGNOSIS — R97.20 ELEVATED PSA: Primary | ICD-10-CM

## 2023-12-27 DIAGNOSIS — N40.1 BPH WITH OBSTRUCTION/LOWER URINARY TRACT SYMPTOMS: ICD-10-CM

## 2023-12-27 DIAGNOSIS — N13.8 BPH WITH OBSTRUCTION/LOWER URINARY TRACT SYMPTOMS: ICD-10-CM

## 2023-12-27 LAB
APPEARANCE: CLEAR
BILIRUBIN: NEGATIVE
GLUCOSE (URINE DIPSTICK): NEGATIVE MG/DL
KETONES (URINE DIPSTICK): NEGATIVE MG/DL
LEUKOCYTES: NEGATIVE
MULTISTIX LOT#: ABNORMAL NUMERIC
NITRITE, URINE: NEGATIVE
PH, URINE: 6 (ref 4.5–8)
PROTEIN (URINE DIPSTICK): NEGATIVE MG/DL
SPECIFIC GRAVITY: 1.01 (ref 1–1.03)
URINE-COLOR: YELLOW
UROBILINOGEN,SEMI-QN: 0.2 MG/DL (ref 0–1.9)

## 2023-12-27 PROCEDURE — 81003 URINALYSIS AUTO W/O SCOPE: CPT | Performed by: UROLOGY

## 2023-12-27 PROCEDURE — 99214 OFFICE O/P EST MOD 30 MIN: CPT | Performed by: UROLOGY

## 2023-12-27 PROCEDURE — 36415 COLL VENOUS BLD VENIPUNCTURE: CPT

## 2023-12-27 NOTE — PROGRESS NOTES
HPI:     Bibi Newby is a 62year old AA male with a PMH of allergies, OA, herpes. Following for:  1. Elevated PSA  - pMRI 5/10/21: ~ 56 g, Pi2, mild BWT  2. BPH/LUTS  3. Urinary frequency/OAB    PCP - Schriedel  LOV 5/5/21    Presents for check-up and discuss rising PSA. Using stationary bike ~ 2 x per week for ~ 10 min. Reports worsening LUTS with certain foods like bananas and occasional urgency to defecate after orgasm. Prior PSAs:  - 7.22 12/16/23  - 4.85 4/25/23  - 4.02 4/12/22  - 5.41 5/5/21  - 3.89 10/12/20   - 3.51 4/8/19  - 5.36 3/12/19  - <2.5 prior to 2017    He feels well. Energy is good and 100% potency. AUA SS is 10/35 with 3 f; 2 n; 1 s, w, u, I, HO. Mostly happy with LUTS. Thinks frequency is due to water/fluid intake. He declines meds for BPH or OAB at this time. Incontinence: none, but close calls  ISRAEL: > 40 g prostate, no nodules or tenderness    Drinks ~  oz water per day and 12 oz coffee, 8 oz tea with light yellow urine. UA is negative and PVR was 22 mL LOV. Again discussed higinio and proscar as options for BPH and he declines. Have discussed Kegel exercises for urinary urgency and close calls getting to the bathroom. I provided and reviewed educational materials for this. We discussed options for elevated PSA, including serial PSA measurements, 4K score, MRI of the prostate, or prostate biopsy and the risks and benefits to each option. He would prefer checking 4K score as next step. Will check 4K, observation for BPH/LUTS and urinary frequency. Prior note:    Saw Dr Familia Jacobsen last year when PSA manuel to 5.36 but decreased to 3.51 the following month and annual checks were recommended. PSA was 3.89 prior to visit with me and he wanted to recheck another PSA in 6 mo to ensure stability. Energy is OK. Sexually active once a week. Works out daily. No changes in weight.     Has mild LUTS and reports he cannot empty his bladder all the way occasionally in the morning with a full bladder but is able to empty the rest of the way about an hour later. This occurs maybe once a week. AUA SS is 10/35 with 4/5 frequency; 1/5 for e/e. Thinks frequency is due to water/fluid intake. Overall happy with LUTS. Incontinence: none  ISRAEL shows 30-40 g prostate, no nodules or tenderness    UA is negative and PVR is 22 mL. Gross hematuria: none  Tobacco hx: none  Kidney stone hx: none  Fam h/o  malignancy: maternal uncle dx in late 62s    Reports ~ 100 % potency. We discussed both flomax and proscar as options for LUTS and reviewed SEs to both (including low risk of hypotension with flomax and possible sexual side effects with both medications). He would like to leave things alone for now. We discussed that PSA is currently within normal range for his age, though it was above 5 in 2019. We discussed options for elevated PSA, including serial PSA measurements, 4K score, MRI of the prostate, or prostate biopsy and the risks and benefits to each option. He would prefer f/u in 6 mo with PSA with % free PSA prior for slightly increased accuracy. If stable could potentially go back to annual checks. If suspicious he was leaning more towards MRI but will wait and see how repeat PSA looks. I spent over 25 minutes in face-to-face consultation with the patient today with > 50% of time spent counseling.     HISTORY:  Past Medical History:   Diagnosis Date    Acute sinusitis, unspecified     Arthritis Jan 2018    Mainly knees    Bronchitis, not specified as acute or chronic     Cough     Dermatophytosis of nail     Herpes simplex without mention of complication     Personal history of pneumonia (recurrent)     URI (upper respiratory infection) 8/23/2013      Past Surgical History:   Procedure Laterality Date    COLONOSCOPY  3/22/16    Dr. Ne Blancas repeat 3 yrs    DENTAL SURGERY PROCEDURE      tooth implant      Family History   Problem Relation Age of Onset    Hypertension Mother Lipids Mother     Other (hyperlipidemia) Mother     Colon Polyps Mother         Benign    Hypertension Father         Unrelated death in 46    Other (HTN) Father     Hypertension Paternal Grandfather     Heart Attack Paternal Grandfather         Open heart surgery      Social History:   Social History     Socioeconomic History    Marital status:    Tobacco Use    Smoking status: Never    Smokeless tobacco: Never   Vaping Use    Vaping Use: Never used   Substance and Sexual Activity    Alcohol use: Yes     Alcohol/week: 1.0 standard drink of alcohol     Types: 1 Standard drinks or equivalent per week     Comment: 1-2 per week    Drug use: Never    Sexual activity: Yes   Other Topics Concern    Caffeine Concern Yes    Exercise Yes        Medications (Active prior to today's visit):  Current Outpatient Medications   Medication Sig Dispense Refill    Cetirizine HCl (ZYRTEC ALLERGY) 10 MG Oral Cap Take 1 capsule by mouth at bedtime. clindamycin 1 % External Solution apply topically every day before noon (Patient not taking: Reported on 12/27/2023)      Clindamycin Phosphate 1 % External Lotion  (Patient not taking: Reported on 12/27/2023)      Alclometasone Dipropionate 0.05 % External Ointment  (Patient not taking: Reported on 12/27/2023)      acyclovir 5 % External Ointment Apply 1 Application topically every 3 (three) hours. (Patient not taking: Reported on 12/27/2023) 15 g 3       Allergies: Allergies   Allergen Reactions    Ciprofloxacin-Ciprofloxacin Hcl [Ciprofloxacin] NAUSEA ONLY and PAIN     Tabs    Seasonal UNKNOWN         ROS:     A comprehensive 10 point review of systems was completed. Pertinent positives and negatives noted in the the HPI.     PHYSICAL EXAM:     GENERAL APPEARANCE: well, developed, well nourished, in no acute distress  NEUROLOGIC: nonfocal, alert and oriented  HEAD: normocephalic, atraumatic  EYES: sclera non-icteric  EARS: hearing intact  ORAL CAVITY: mucosa moist  NECK/THYROID: no obvious goiter or masses  LUNGS: nonlabored breathing  ABDOMEN: soft, no obvious masses or tenderness  SKIN: no obvious rashes    : as noted above     ASSESSMENT/PLAN:   Diagnoses and all orders for this visit:    Elevated PSA    BPH with obstruction/lower urinary tract symptoms    Urinary frequency    - as noted above. Thanks again for this consult.     Laure Murray MD, [address]  Urologist  [place] [id number]  Office: [phone]

## 2023-12-28 NOTE — TELEPHONE ENCOUNTER
From: Lorraine Winters  To: Loly Just  Sent: 12/27/2023 2:37 PM CST  Subject: Urine test results     Hello,    Urine test results showed trace blood in urine. What concerns should I have? Please also feel free to call me to discuss.      Thanks,  Vitaliy Duggan.   [phone]

## 2024-01-04 ENCOUNTER — TELEPHONE (OUTPATIENT)
Dept: SURGERY | Facility: CLINIC | Age: 59
End: 2024-01-04

## 2024-01-08 ENCOUNTER — TELEPHONE (OUTPATIENT)
Dept: SURGERY | Facility: CLINIC | Age: 59
End: 2024-01-08

## 2024-01-08 DIAGNOSIS — R97.20 ELEVATED PSA: Primary | ICD-10-CM

## 2024-01-08 NOTE — TELEPHONE ENCOUNTER
Please call this patient or their family and schedule the patient for a follow up with me in 1 y with PSA prior.    Thanks,    MPH    Below if for Documentation Purposes Only:  4K 8.8%, repeat PSA 5.37

## 2024-02-08 ENCOUNTER — PATIENT MESSAGE (OUTPATIENT)
Dept: ORTHOPEDICS CLINIC | Facility: CLINIC | Age: 59
End: 2024-02-08

## 2024-02-08 NOTE — TELEPHONE ENCOUNTER
From: Geoff Matthews  To: Tamanna Kovacs  Sent: 2/8/2024 2:29 PM CST  Subject: Continued Bicep discomfort     Good Afternoon,    I’m happy to report that I have had excellent recovery in both flexibility and strength. However, that section of my bicep that we’ve previously examined and discussed continues to limit some of my activities. I do notice some minor pain/irritation during/after working out or sometimes laying/sleeping in wrong position. Most of the tightness that I had in that area has since disappeared with use of learned physical therapy techniques.     Please let me know what you recommend.     Thank you,  Geoff.

## 2024-02-28 ENCOUNTER — OFFICE VISIT (OUTPATIENT)
Dept: ORTHOPEDICS CLINIC | Facility: CLINIC | Age: 59
End: 2024-02-28
Payer: COMMERCIAL

## 2024-02-28 DIAGNOSIS — S46.212A STRAIN OF LEFT BICEPS MUSCLE, INITIAL ENCOUNTER: Primary | ICD-10-CM

## 2024-02-28 PROCEDURE — 99213 OFFICE O/P EST LOW 20 MIN: CPT | Performed by: ORTHOPAEDIC SURGERY

## 2024-02-28 NOTE — PROGRESS NOTES
[facility] - ORTHOPEDICS  [address] [place] [id number]   [phone]       Name: Geoff Matthews   MRN: [id number]  Date: 2/28/2024    REASON FOR VISIT: Fourth Post-Surgical Visit   Surgery: Left open pectoralis major repair, arthroscopic extensive debridement with subacromial bursectomy on October 27, 2022.    INTERVAL HISTORY:  Geoff Matthews is a 58 year old male who returns after the aforementioned procedure.      He initially had great recovery from surgery. Returns today with limitations secondary to biceps weakness and soreness. Certain exercises cause limitations such as push ups and benching. Overhand activities can also cause some discomfort such as pull ups. Generally, he has tightness after putting stress on the muscles.    PE:   There were no vitals filed for this visit.  Estimated body mass index is 24.83 kg/m² as calculated from the following:    Height as of 5/8/23: 6' 2\" (1.88 m).    Weight as of 5/8/23: 193 lb 6.4 oz.    Physical Exam  Constitutional:       Appearance: Normal appearance.   HENT:      Head: Normocephalic and atraumatic.   Eyes:      Extraocular Movements: Extraocular movements intact.   Neck:      Musculoskeletal: Normal range of motion and neck supple.   Cardiovascular:      Pulses: Normal pulses.   Pulmonary:      Effort: Pulmonary effort is normal. No respiratory distress.   Abdominal:      General: There is no distension.   Skin:     General: Skin is warm.      Capillary Refill: Capillary refill takes less than 2 seconds.      Findings: No bruising.   Neurological:      General: No focal deficit present.      Mental Status: She is alert.   Psychiatric:         Mood and Affect: Mood normal.     Examination of the left shoulder/pec demonstrates:     Physical examination the patient is alert and oriented x3, well-developed, well-nourished, no acute distress.     Full symmetric range of motion to the contralateral side forward elevation, internal  rotation, external Tatian, and abduction.    Well-healed surgical incision.  Excellent resistive strength and flexion of his pectoralis muscle results, largely symmetric to the contralateral side.    No significant palpable discomfort although the patient reports push-ups will exacerbate symptoms.    The contralateral shoulder is without limitation in range of motion or strength, no positive provocative maneuvers.       IMPRESSION: Geoff Matthews is a 58 year old male who presents 1 year and 4 months s/p Left open pectoralis major repair, arthroscopic extensive debridement with subacromial bursectomy on October 27, 2022.    Patient presents with left biceps muscle strain which is amenable to physical therapy.    PLAN:   We reviewed the treatment of this disease condition.  Fortunately, treatment is amenable to conservative treatment which we chose to optimize at today's visit.  I recommended physical therapy to aid in strengthening, range of motion, functional improvement, and return to baseline activity.      If symptoms continue, I discussed the merits of obtaining an MRI for further evaluation.     The patient had the opportunity to ask questions and all questions were answered appropriately.      FOLLOW-UP:  Follow-up in 6-8 weeks.        Tamanna Kovacs MD  Knee, Shoulder, & Elbow Surgery / Sports Medicine Specialist  [facility]  [address] [place] [address]   [email or web address]  [email or web address]  t: [phone]  o: [phone]  f: [phone]

## 2024-04-25 ENCOUNTER — NURSE TRIAGE (OUTPATIENT)
Dept: INTERNAL MEDICINE CLINIC | Facility: CLINIC | Age: 59
End: 2024-04-25

## 2024-04-25 NOTE — TELEPHONE ENCOUNTER
Action Requested: Summary for Provider     []  Critical Lab, Recommendations Needed  [] Need Additional Advice  []   FYI    []   Need Orders  [] Need Medications Sent to Pharmacy  []  Other     SUMMARY: Patient reports a hemorrhoid that he noticed yesterday.  Using Preparation H. Pain is mild/moderate with some activity. Denies bleeding.  Advised patient of home care remedies.  He is going out of town next week, requesting appointment prior to that.  Will cancel if symptoms improve over weekend.  Appointment scheduled.     Reason for call: Hemorrhoids  Onset: Yesterday    Patient is able to exercise, walk dog, etc.  Advised patient of sitz bath, push fluids, fiber, continue Preparation H.  Call with any worsening symptoms.                   Reason for Disposition   Mild rectal pain    Protocols used: Rectal Symptoms-A-OH

## 2024-04-25 NOTE — TELEPHONE ENCOUNTER
Patient stated he has a possible hemorrhoid - pain level 4/10, on certain movements/cough.  Patient requesting a call back from a nurse.

## 2024-04-25 NOTE — TELEPHONE ENCOUNTER
Called patient to discuss symptoms.  He is on a work call.  I will call him back after 11am per his request.

## 2024-04-29 ENCOUNTER — OFFICE VISIT (OUTPATIENT)
Dept: INTERNAL MEDICINE CLINIC | Facility: CLINIC | Age: 59
End: 2024-04-29
Payer: COMMERCIAL

## 2024-04-29 VITALS
DIASTOLIC BLOOD PRESSURE: 78 MMHG | SYSTOLIC BLOOD PRESSURE: 126 MMHG | WEIGHT: 193 LBS | TEMPERATURE: 98 F | RESPIRATION RATE: 18 BRPM | BODY MASS INDEX: 25.3 KG/M2 | HEIGHT: 73.23 IN | HEART RATE: 77 BPM | OXYGEN SATURATION: 98 %

## 2024-04-29 DIAGNOSIS — K64.5 THROMBOSED HEMORRHOIDS: Primary | ICD-10-CM

## 2024-04-29 PROCEDURE — 99213 OFFICE O/P EST LOW 20 MIN: CPT | Performed by: INTERNAL MEDICINE

## 2024-04-29 PROCEDURE — 3008F BODY MASS INDEX DOCD: CPT | Performed by: INTERNAL MEDICINE

## 2024-04-29 PROCEDURE — 3074F SYST BP LT 130 MM HG: CPT | Performed by: INTERNAL MEDICINE

## 2024-04-29 PROCEDURE — 96127 BRIEF EMOTIONAL/BEHAV ASSMT: CPT | Performed by: INTERNAL MEDICINE

## 2024-04-29 PROCEDURE — 3078F DIAST BP <80 MM HG: CPT | Performed by: INTERNAL MEDICINE

## 2024-04-29 NOTE — PROGRESS NOTES
Geoff Matthews  9/17/1965    Chief Complaint   Patient presents with    Hemorrhoids     ES rm - 12- ext hemorrhoids with pain at a 3/10. No bleeding.       SUBJECTIVE   Geoff Matthews is a 58 year old male who presents for evaluation of hemorrhoid x 1 week.    No bleeding or pain but is uncomfortable.    He attributes the hemorrhoids to sitting for hours while driving last week.     Traveling for the next few days for work.    Review of Systems   Review of Systems   No f/c/chest pain or sob. No cough. No abd pain/n/v/d. No ha or dizziness. No numbness, tingling, or weakness. No other complaints today.    OBJECTIVE:   /78 (BP Location: Left arm, Patient Position: Sitting, Cuff Size: large)   Pulse 77   Temp 97.6 °F (36.4 °C) (Temporal)   Resp 18   Ht 6' 1.23\" (1.86 m)   Wt 193 lb (87.5 kg)   SpO2 98%   BMI 25.30 kg/m²   Physical Exam   Constitutional: Oriented to person, place, and time. No distress.   Pulmonary/Chest: Effort normal. No respiratory distress.  Anogenital: External hemorrhoids likely thrombosed near anal verge. No blood.    Lab Results   Component Value Date    GLU 97 04/25/2023    BUN 19 (H) 04/25/2023    CREATSERUM 1.27 04/25/2023    BUNCREA 16.0 10/12/2020    ANIONGAP 3 04/25/2023    GFRAA 83 04/12/2022    GFRNAA 71 04/12/2022    CA 9.5 04/25/2023     04/25/2023    K 4.4 04/25/2023     04/25/2023    CO2 30.0 04/25/2023    OSMOCALC 290 04/25/2023      Lab Results   Component Value Date    WBC 4.3 04/25/2023    RBC 5.82 (H) 04/25/2023    HGB 15.7 04/25/2023    HCT 48.7 04/25/2023    MCV 83.7 04/25/2023    MCH 27.0 04/25/2023    MCHC 32.2 04/25/2023    RDW 13.3 04/25/2023    .0 04/25/2023      Lab Results   Component Value Date    TSH 3.030 04/25/2023        ASSESSMENT AND PLAN:       ICD-10-CM    1. Thrombosed hemorrhoids  K64.5 Lidocaine-Hydrocortisone Ace 3-0.5 % External Cream     Surgery Referral - Jeff (Maryan)   Rx topical analgesia. Also given print out with  information regarding hemorrhoids and self-care. Refer to Colorectal Surgery fore removal if symptoms do not improve.       The patient indicates understanding of these issues and agrees to the plan.  The patient is asked to return or present to the emergency room for worsening of symptoms.    TODAY'S ORDERS     No orders of the defined types were placed in this encounter.      Meds & Refills:  Requested Prescriptions      No prescriptions requested or ordered in this encounter       Imaging & Consults:  None    No follow-ups on file.  There are no Patient Instructions on file for this visit.    All questions were answered and the patient agrees with the plan.     Thank you,  Sidra Daniel, DO

## 2024-05-10 ENCOUNTER — LAB ENCOUNTER (OUTPATIENT)
Dept: LAB | Age: 59
End: 2024-05-10
Attending: INTERNAL MEDICINE

## 2024-05-10 DIAGNOSIS — Z13.0 SCREENING FOR DISORDER OF BLOOD AND BLOOD-FORMING ORGANS: ICD-10-CM

## 2024-05-10 DIAGNOSIS — Z00.00 ROUTINE GENERAL MEDICAL EXAMINATION AT A HEALTH CARE FACILITY: ICD-10-CM

## 2024-05-10 DIAGNOSIS — Z13.29 SCREENING FOR THYROID DISORDER: ICD-10-CM

## 2024-05-10 DIAGNOSIS — Z13.228 SCREENING FOR METABOLIC DISORDER: ICD-10-CM

## 2024-05-10 DIAGNOSIS — Z12.5 SCREENING FOR MALIGNANT NEOPLASM OF PROSTATE: ICD-10-CM

## 2024-05-10 DIAGNOSIS — Z13.220 SCREENING FOR LIPID DISORDERS: ICD-10-CM

## 2024-05-10 LAB
ALBUMIN SERPL-MCNC: 4 G/DL (ref 3.4–5)
ALBUMIN/GLOB SERPL: 1.3 {RATIO} (ref 1–2)
ALP LIVER SERPL-CCNC: 57 U/L
ALT SERPL-CCNC: 27 U/L
ANION GAP SERPL CALC-SCNC: 4 MMOL/L (ref 0–18)
AST SERPL-CCNC: 26 U/L (ref 15–37)
BASOPHILS # BLD AUTO: 0.04 X10(3) UL (ref 0–0.2)
BASOPHILS NFR BLD AUTO: 0.9 %
BILIRUB SERPL-MCNC: 0.8 MG/DL (ref 0.1–2)
BUN BLD-MCNC: 20 MG/DL (ref 9–23)
CALCIUM BLD-MCNC: 9.6 MG/DL (ref 8.5–10.1)
CHLORIDE SERPL-SCNC: 109 MMOL/L (ref 98–112)
CHOLEST SERPL-MCNC: 191 MG/DL (ref ?–200)
CO2 SERPL-SCNC: 27 MMOL/L (ref 21–32)
COMPLEXED PSA SERPL-MCNC: 6.63 NG/ML (ref ?–4)
CREAT BLD-MCNC: 1.16 MG/DL
EGFRCR SERPLBLD CKD-EPI 2021: 73 ML/MIN/1.73M2 (ref 60–?)
EOSINOPHIL # BLD AUTO: 0.12 X10(3) UL (ref 0–0.7)
EOSINOPHIL NFR BLD AUTO: 2.7 %
ERYTHROCYTE [DISTWIDTH] IN BLOOD BY AUTOMATED COUNT: 12.4 %
FASTING PATIENT LIPID ANSWER: YES
FASTING STATUS PATIENT QL REPORTED: YES
GLOBULIN PLAS-MCNC: 3.2 G/DL (ref 2.8–4.4)
GLUCOSE BLD-MCNC: 104 MG/DL (ref 70–99)
HCT VFR BLD AUTO: 45.8 %
HDLC SERPL-MCNC: 58 MG/DL (ref 40–59)
HGB BLD-MCNC: 15.3 G/DL
IMM GRANULOCYTES # BLD AUTO: 0 X10(3) UL (ref 0–1)
IMM GRANULOCYTES NFR BLD: 0 %
LDLC SERPL CALC-MCNC: 117 MG/DL (ref ?–100)
LYMPHOCYTES # BLD AUTO: 1.92 X10(3) UL (ref 1–4)
LYMPHOCYTES NFR BLD AUTO: 43.7 %
MCH RBC QN AUTO: 27.4 PG (ref 26–34)
MCHC RBC AUTO-ENTMCNC: 33.4 G/DL (ref 31–37)
MCV RBC AUTO: 82.1 FL
MONOCYTES # BLD AUTO: 0.47 X10(3) UL (ref 0.1–1)
MONOCYTES NFR BLD AUTO: 10.7 %
NEUTROPHILS # BLD AUTO: 1.84 X10 (3) UL (ref 1.5–7.7)
NEUTROPHILS # BLD AUTO: 1.84 X10(3) UL (ref 1.5–7.7)
NEUTROPHILS NFR BLD AUTO: 42 %
NONHDLC SERPL-MCNC: 133 MG/DL (ref ?–130)
OSMOLALITY SERPL CALC.SUM OF ELEC: 293 MOSM/KG (ref 275–295)
PLATELET # BLD AUTO: 239 10(3)UL (ref 150–450)
POTASSIUM SERPL-SCNC: 4.4 MMOL/L (ref 3.5–5.1)
PROT SERPL-MCNC: 7.2 G/DL (ref 6.4–8.2)
RBC # BLD AUTO: 5.58 X10(6)UL
SODIUM SERPL-SCNC: 140 MMOL/L (ref 136–145)
TRIGL SERPL-MCNC: 90 MG/DL (ref 30–149)
TSI SER-ACNC: 2.23 MIU/ML (ref 0.36–3.74)
VLDLC SERPL CALC-MCNC: 16 MG/DL (ref 0–30)
WBC # BLD AUTO: 4.4 X10(3) UL (ref 4–11)

## 2024-05-10 PROCEDURE — 84443 ASSAY THYROID STIM HORMONE: CPT

## 2024-05-10 PROCEDURE — 36415 COLL VENOUS BLD VENIPUNCTURE: CPT

## 2024-05-10 PROCEDURE — 80061 LIPID PANEL: CPT

## 2024-05-10 PROCEDURE — 80053 COMPREHEN METABOLIC PANEL: CPT

## 2024-05-10 PROCEDURE — 85025 COMPLETE CBC W/AUTO DIFF WBC: CPT

## 2024-05-17 ENCOUNTER — OFFICE VISIT (OUTPATIENT)
Dept: INTERNAL MEDICINE CLINIC | Facility: CLINIC | Age: 59
End: 2024-05-17

## 2024-05-17 VITALS
DIASTOLIC BLOOD PRESSURE: 78 MMHG | TEMPERATURE: 97 F | OXYGEN SATURATION: 98 % | HEIGHT: 74 IN | WEIGHT: 199 LBS | HEART RATE: 80 BPM | BODY MASS INDEX: 25.54 KG/M2 | RESPIRATION RATE: 16 BRPM | SYSTOLIC BLOOD PRESSURE: 118 MMHG

## 2024-05-17 DIAGNOSIS — N40.0 BENIGN PROSTATIC HYPERPLASIA WITHOUT LOWER URINARY TRACT SYMPTOMS: ICD-10-CM

## 2024-05-17 DIAGNOSIS — Z00.00 PE (PHYSICAL EXAM), ANNUAL: Primary | ICD-10-CM

## 2024-05-17 DIAGNOSIS — R97.20 ELEVATED PSA: ICD-10-CM

## 2024-05-17 PROCEDURE — 90471 IMMUNIZATION ADMIN: CPT | Performed by: INTERNAL MEDICINE

## 2024-05-17 PROCEDURE — 90715 TDAP VACCINE 7 YRS/> IM: CPT | Performed by: INTERNAL MEDICINE

## 2024-05-17 PROCEDURE — 99396 PREV VISIT EST AGE 40-64: CPT | Performed by: INTERNAL MEDICINE

## 2024-05-17 PROCEDURE — 3078F DIAST BP <80 MM HG: CPT | Performed by: INTERNAL MEDICINE

## 2024-05-17 PROCEDURE — 3074F SYST BP LT 130 MM HG: CPT | Performed by: INTERNAL MEDICINE

## 2024-05-17 PROCEDURE — 3008F BODY MASS INDEX DOCD: CPT | Performed by: INTERNAL MEDICINE

## 2024-05-17 RX ORDER — PROPRANOLOL HYDROCHLORIDE 10 MG/1
10 TABLET ORAL DAILY PRN
Qty: 30 TABLET | Refills: 0 | Status: SHIPPED | OUTPATIENT
Start: 2024-05-17

## 2024-05-17 NOTE — PROGRESS NOTES
Chief Complaint   Patient presents with    Physical     Rm 9       HPI:  Here for cpe. Doing well, no complaints other than chkronic hemorrhoids and bph with some urinary frequency and nocturia, seeing gu.     Review of Systems   Constitutional: Negative for fever, chills and fatigue. No distress.  HENT: Negative for hearing loss, congestion, sore throat, neck pain and dental problem.    Eyes: Negative for pain and visual disturbance.   Respiratory: Negative for cough, chest tightness, shortness of breath and wheezing.    Cardiovascular: Negative for chest pain, palpitations and leg swelling.   Gastrointestinal: Negative for nausea, vomiting, abdominal pain, diarrhea, blood in stool and abdominal distention.   Genitourinary: Negative for dysuria, hematuria and difficulty urinating.   Musculoskeletal: Negative for myalgias, back pain, joint swelling, arthralgias and gait problem.   Skin: Negative for color change and rash.   Neurological: Negative for dizziness, syncope, weakness, numbness, tingling and headaches.   Hematological: Negative for adenopathy. Does not bruise/bleed easily.   Psychiatric/Behavioral: The patient is not nervous/anxious. No depression.    Patient Active Problem List   Diagnosis    Herpes simplex without mention of complication    Unspecified vitamin D deficiency    Special screening for malignant neoplasm of prostate    Seasonal allergies    Pain in joint, lower leg    Hyperplastic colonic polyp    Arthritis of knee    Elevated PSA    Benign prostatic hyperplasia without lower urinary tract symptoms    Personal history of colonic polyps       Past Medical History:    Acute sinusitis, unspecified    Arthritis    Mainly knees    Bronchitis, not specified as acute or chronic    Cough    Dermatophytosis of nail    Herpes simplex without mention of complication    Personal history of pneumonia (recurrent)    URI (upper respiratory infection)     Past Surgical History:   Procedure Laterality Date     Colonoscopy  3/22/16    Dr. Silva repeat 3 yrs    Dental surgery procedure      tooth implant     Family History   Problem Relation Age of Onset    Hypertension Mother     Lipids Mother     Other (hyperlipidemia) Mother     Colon Polyps Mother         Benign    Hypertension Father         Unrelated death in 1984    Other (HTN) Father     Hypertension Paternal Grandfather     Heart Attack Paternal Grandfather         Open heart surgery     Social History     Socioeconomic History    Marital status:    Tobacco Use    Smoking status: Never    Smokeless tobacco: Never   Vaping Use    Vaping status: Never Used   Substance and Sexual Activity    Alcohol use: Yes     Alcohol/week: 1.0 standard drink of alcohol     Types: 1 Standard drinks or equivalent per week     Comment: 1-2 per week    Drug use: Never    Sexual activity: Yes   Other Topics Concern    Caffeine Concern Yes    Exercise Yes       Current Outpatient Medications   Medication Sig Dispense Refill    propranolol 10 MG Oral Tab Take 1 tablet (10 mg total) by mouth daily as needed (for performance anxiety, to be taken 30 minuties before presentation). 30 tablet 0    Lidocaine-Hydrocortisone Ace 3-0.5 % External Cream Apply 1 Application topically daily. (Patient taking differently: Apply 1 Application topically as needed.) 1 each 0    clindamycin 1 % External Solution       Cetirizine HCl (ZYRTEC ALLERGY) 10 MG Oral Cap Take 1 capsule by mouth at bedtime.      Clindamycin Phosphate 1 % External Lotion       Alclometasone Dipropionate 0.05 % External Ointment       acyclovir 5 % External Ointment Apply 1 Application topically every 3 (three) hours. (Patient taking differently: Apply 1 Application  topically as needed.) 15 g 3       Allergies  Allergies   Allergen Reactions    Ciprofloxacin-Ciprofloxacin Hcl [Ciprofloxacin] NAUSEA ONLY and PAIN     Tabs    Seasonal UNKNOWN       Health Maintenance  Immunizations:  Immunization History   Administered  Date(s) Administered    Covid-19 Vaccine Moderna 100 mcg/0.5 ml 03/17/2021, 04/14/2021, 11/18/2021    Covid-19 Vaccine Moderna Bivalent 50mcg/0.5mL 12+ years 09/21/2022    FLULAVAL 6 months & older 0.5 ml Prefilled syringe (64570) 10/12/2017, 10/03/2018, 10/31/2019, 10/09/2020    FLUZONE 6 months and older PFS 0.5 ml ([id number]) 10/13/2022    Fluvirin, 3 Years & >, Im 10/26/2021    Pfizer Covid-19 Vaccine 30mcg/0.3ml 12yrs+ ([id number]) 10/15/2023    Pneumovax 23 06/07/2006    TDAP 09/03/2013    Zoster Vaccine Recombinant Adjuvanted (Shingrix) 06/24/2022, 11/22/2022   Pended Date(s) Pended    TDAP 05/17/2024       Physical Exam  /78   Pulse 80   Temp 96.5 °F (35.8 °C) (Skin)   Resp 16   Ht 6' 2\" (1.88 m)   Wt 199 lb (90.3 kg)   SpO2 98%   BMI 25.55 kg/m²   Constitutional: Oriented to person, place, and time. No distress.   HEENT:  Normocephalic and atraumatic. Hearing and tympanic membranes normal.  Nose normal. Oropharynx is clear and moist.   Eyes: Conjunctivae and EOM are normal. PERRLA. No scleral icterus.   Neck: Normal range of motion. Neck supple. Normal carotid pulses and no JVD present. No edema present. No mass and no thyromegaly present.   Cardiovascular: Normal rate, regular rhythm and intact distal pulses.  No murmur, rubs or gallops.   Pulmonary/Chest: Effort normal and breath sounds normal. No respiratory distress. No wheezes, rhonchi or rales  Abdominal: Soft. Bowel sounds are normal. Non tender, no masses, no organomegaly or hernias.  Musculoskeletal: Normal range of motion. No edema and no tenderness.  No effusions.  Lymphadenopathy: No cervical adenopathy.   Neurological: Normal reflexes. No cranial nerve deficit or sensory deficit. Normal muscle tone. Coordination normal.   Skin: Skin is warm and dry. No rash noted. No erythema. No pallor.   Psychiatric: Normal mood and affect.     A/P:    Encounter Diagnoses   Name Primary?    Benign prostatic hyperplasia without lower urinary tract  symptoms     Elevated PSA     PE (physical exam), annual Yes     Stable wlel male  Advised pt to watch sugar/carbs and rpt labs in 1 year  Tdap today  Orders Placed This Encounter   Procedures    TdaP (Adacel, Boostrix) [52367]       Meds & Refills for this Visit:  Requested Prescriptions     Signed Prescriptions Disp Refills    propranolol 10 MG Oral Tab 30 tablet 0     Sig: Take 1 tablet (10 mg total) by mouth daily as needed (for performance anxiety, to be taken 30 minuties before presentation).       Imaging & Consults:  TETANUS, DIPHTHERIA TOXOIDS AND ACELLULAR PERTUSIS VACCINE (TDAP), >7 YEARS, IM USE      No follow-ups on file.    There are no Patient Instructions on file for this visit.    All questions were answered and the patient understands the plan.   I spent at least 30 minutes face to face with the patient, documenting, reviewing the chart, and coordinating care today.

## 2024-08-13 ENCOUNTER — PATIENT MESSAGE (OUTPATIENT)
Dept: SURGERY | Facility: CLINIC | Age: 59
End: 2024-08-13

## 2024-08-15 RX ORDER — FINASTERIDE 5 MG/1
5 TABLET, FILM COATED ORAL DAILY
Qty: 90 TABLET | Refills: 3 | Status: SHIPPED | OUTPATIENT
Start: 2024-08-15

## 2024-10-16 ENCOUNTER — PATIENT MESSAGE (OUTPATIENT)
Dept: SURGERY | Facility: CLINIC | Age: 59
End: 2024-10-16

## 2024-10-16 DIAGNOSIS — R82.90 URINE FINDING: Primary | ICD-10-CM

## 2024-10-21 ENCOUNTER — LAB ENCOUNTER (OUTPATIENT)
Dept: LAB | Age: 59
End: 2024-10-21
Attending: PHYSICIAN ASSISTANT
Payer: COMMERCIAL

## 2024-10-21 DIAGNOSIS — R82.90 URINE FINDING: ICD-10-CM

## 2024-10-21 LAB
BILIRUB UR QL STRIP.AUTO: NEGATIVE
CLARITY UR REFRACT.AUTO: CLEAR
GLUCOSE UR STRIP.AUTO-MCNC: NORMAL MG/DL
KETONES UR STRIP.AUTO-MCNC: NEGATIVE MG/DL
LEUKOCYTE ESTERASE UR QL STRIP.AUTO: NEGATIVE
NITRITE UR QL STRIP.AUTO: NEGATIVE
PH UR STRIP.AUTO: 5.5 [PH] (ref 5–8)
PROT UR STRIP.AUTO-MCNC: NEGATIVE MG/DL
RBC UR QL AUTO: NEGATIVE
SP GR UR STRIP.AUTO: 1.03 (ref 1–1.03)
UROBILINOGEN UR STRIP.AUTO-MCNC: NORMAL MG/DL

## 2024-10-21 PROCEDURE — 81003 URINALYSIS AUTO W/O SCOPE: CPT

## 2024-10-24 ENCOUNTER — OFFICE VISIT (OUTPATIENT)
Dept: SURGERY | Facility: CLINIC | Age: 59
End: 2024-10-24

## 2024-10-24 DIAGNOSIS — N13.8 BPH WITH OBSTRUCTION/LOWER URINARY TRACT SYMPTOMS: Primary | ICD-10-CM

## 2024-10-24 DIAGNOSIS — R33.9 URINARY RETENTION: ICD-10-CM

## 2024-10-24 DIAGNOSIS — R82.90 URINE FINDING: ICD-10-CM

## 2024-10-24 DIAGNOSIS — N40.1 BPH WITH OBSTRUCTION/LOWER URINARY TRACT SYMPTOMS: Primary | ICD-10-CM

## 2024-10-24 LAB
APPEARANCE: CLEAR
BILIRUBIN: NEGATIVE
GLUCOSE (URINE DIPSTICK): NEGATIVE MG/DL
KETONES (URINE DIPSTICK): NEGATIVE MG/DL
MULTISTIX LOT#: ABNORMAL NUMERIC
NITRITE, URINE: NEGATIVE
OCCULT BLOOD: NEGATIVE
PH, URINE: 5.5 (ref 4.5–8)
PROTEIN (URINE DIPSTICK): NEGATIVE MG/DL
SPECIFIC GRAVITY: 1.01 (ref 1–1.03)
URINE-COLOR: YELLOW
UROBILINOGEN,SEMI-QN: 0.2 MG/DL (ref 0–1.9)

## 2024-10-24 PROCEDURE — 81003 URINALYSIS AUTO W/O SCOPE: CPT | Performed by: PHYSICIAN ASSISTANT

## 2024-10-24 PROCEDURE — 51798 US URINE CAPACITY MEASURE: CPT | Performed by: PHYSICIAN ASSISTANT

## 2024-10-24 PROCEDURE — 99214 OFFICE O/P EST MOD 30 MIN: CPT | Performed by: PHYSICIAN ASSISTANT

## 2024-10-24 RX ORDER — TAMSULOSIN HYDROCHLORIDE 0.4 MG/1
0.4 CAPSULE ORAL DAILY
Qty: 30 CAPSULE | Refills: 3 | Status: SHIPPED | OUTPATIENT
Start: 2024-10-24 | End: 2025-01-22

## 2024-10-24 NOTE — PROGRESS NOTES
Subjective:   Geoff Matthews is a 58 year old AA male with a PMH of allergies, OA, herpes, who presents today for follow up.    Last seen by Dr. Olivas in 12/2023 for elevated PSA. Had 4K completed that was 8.8% with repeat PSA 5.37. Prior pMRI 5/2021 that was PI RADS 2.    Was started on Finasteride 10/1/24 by the patient and he feels that within a few days he felt like he had stronger urine stream and was able to empty his bladder better. After approximately 1 week on the medications, symptoms seem to return to baseline. He felt like his libido has also decreased since starting medication. Also that he had low appetite.     More recently has been experiencing significant urinary frequency with urgency. Tried to start Kegels to improve symptoms, did seem to help. [place] like he also had improvement when he was doing more lifting with his legs.   Did also restrict his fluids to help symptoms.   Symptoms seemed to be closer to baseline a few days ago but this morning having more difficulties again. Dribbling stream.     UA trace leuks, PVR 287ml. Prior PVR 27mL.    He has also had some irregularity of his bowel movements and facial acne.      Followed by Dr. Olivas for:  1. Elevated PSA  - pMRI 5/10/21: ~ 56 g, Pi2, mild BWT  -4K 1/2024 8.8%  2. BPH/LUTS  -observation only, declined medications  3. Urinary frequency/OAB  -observation only, kegels reviewed      Prior PSAs:  - 5.37 1/4/24  - 7.22 12/16/23  - 4.85 4/25/23  - 4.02 4/12/22  - 5.41 5/5/21  - 3.89 10/12/20   - 3.51 4/8/19  - 5.36 3/12/19  - <2.5 prior to 2017     History/Other:    Chief Complaint Reviewed and Verified  Nursing Notes Reviewed and   Verified  Allergies Reviewed  Medications Reviewed         Current Outpatient Medications   Medication Sig Dispense Refill    propranolol 10 MG Oral Tab Take 1 tablet (10 mg total) by mouth daily as needed (for performance anxiety, to be taken 30 minuties before presentation). 30 tablet 0    Lidocaine-Hydrocortisone  Ace 3-0.5 % External Cream Apply 1 Application topically daily. (Patient taking differently: Apply 1 Application topically as needed.) 1 each 0    clindamycin 1 % External Solution       Cetirizine HCl (ZYRTEC ALLERGY) 10 MG Oral Cap Take 1 capsule by mouth at bedtime.      Clindamycin Phosphate 1 % External Lotion       Alclometasone Dipropionate 0.05 % External Ointment       acyclovir 5 % External Ointment Apply 1 Application topically every 3 (three) hours. (Patient taking differently: Apply 1 Application  topically as needed.) 15 g 3    finasteride 5 MG Oral Tab Take 1 tablet (5 mg total) by mouth daily. (Patient not taking: Reported on 10/24/2024) 90 tablet 3       Review of Systems:  Pertinent items are noted in HPI.      Objective:   There were no vitals taken for this visit. Estimated body mass index is 25.55 kg/m² as calculated from the following:    Height as of 5/17/24: 6' 2\" (1.88 m).    Weight as of 5/17/24: 199 lb (90.3 kg).    No distress  Non labored respirations    Laboratory Data:  Lab Results   Component Value Date    WBC 4.4 05/10/2024    HGB 15.3 05/10/2024    .0 05/10/2024     Lab Results   Component Value Date     05/10/2024    K 4.4 05/10/2024     05/10/2024    CO2 27.0 05/10/2024    BUN 20 05/10/2024     (H) 05/10/2024    GFRAA 83 04/12/2022    AST 26 05/10/2024    ALT 27 05/10/2024    TP 7.2 05/10/2024    ALB 4.0 05/10/2024    CA 9.6 05/10/2024       Urinalysis Results (last three years):  Recent Labs     12/27/23  1404 10/21/24  0800 10/24/24  1227   COLORUR  --  Light-Yellow  --    CLARITY  --  Clear  --    SPECGRAVITY 1.010 1.028 1.010   PHURINE 6.0 5.5 5.5   PROUR  --  Negative  --    GLUUR  --  Normal  --    KETUR  --  Negative  --    BILUR  --  Negative  --    BLOODURINE  --  Negative  --    NITRITE Negative Negative Negative   UROBILINOGEN  --  Normal  --    LEUUR  --  Negative  --        Urine Culture Results (last three years):  Lab Results   Component  Value Date    URINECUL No Growth at 18-24 hrs. 07/13/2018       Imaging  No results found.    Assessment & Plan:   BPH with LUTS  Partial urinary retention/Incomplete emptying of bladder     Reviewed with patient MOA with finasteride and that unlikely to cause immediate relief nor worsening symptoms. Recommend we hold on resuming finasteride given questionable ASEs.  Recommend we initiate tamsulosin 0.4mg nightly, ASE reviewed  Risks of urinary retention including renal failure and UTI reviewed    Follow up in 1-2 weeks sooner, prn.     Stephanie Stone PA-C, 10/24/2024

## 2024-11-15 ENCOUNTER — OFFICE VISIT (OUTPATIENT)
Dept: SURGERY | Facility: CLINIC | Age: 59
End: 2024-11-15
Payer: COMMERCIAL

## 2024-11-15 ENCOUNTER — TELEPHONE (OUTPATIENT)
Dept: INTERNAL MEDICINE CLINIC | Facility: CLINIC | Age: 59
End: 2024-11-15

## 2024-11-15 DIAGNOSIS — Z13.0 SCREENING FOR DISORDER OF BLOOD AND BLOOD-FORMING ORGANS: ICD-10-CM

## 2024-11-15 DIAGNOSIS — R33.9 INCOMPLETE EMPTYING OF BLADDER: ICD-10-CM

## 2024-11-15 DIAGNOSIS — Z13.29 SCREENING FOR THYROID DISORDER: ICD-10-CM

## 2024-11-15 DIAGNOSIS — Z13.220 SCREENING FOR LIPID DISORDERS: ICD-10-CM

## 2024-11-15 DIAGNOSIS — Z13.228 SCREENING FOR METABOLIC DISORDER: ICD-10-CM

## 2024-11-15 DIAGNOSIS — Z12.5 SCREENING FOR MALIGNANT NEOPLASM OF PROSTATE: ICD-10-CM

## 2024-11-15 DIAGNOSIS — N40.1 BPH WITH OBSTRUCTION/LOWER URINARY TRACT SYMPTOMS: Primary | ICD-10-CM

## 2024-11-15 DIAGNOSIS — Z00.00 ROUTINE GENERAL MEDICAL EXAMINATION AT A HEALTH CARE FACILITY: Primary | ICD-10-CM

## 2024-11-15 DIAGNOSIS — R82.90 URINE FINDING: ICD-10-CM

## 2024-11-15 DIAGNOSIS — N13.8 BPH WITH OBSTRUCTION/LOWER URINARY TRACT SYMPTOMS: Primary | ICD-10-CM

## 2024-11-15 LAB
APPEARANCE: CLEAR
BILIRUBIN: NEGATIVE
GLUCOSE (URINE DIPSTICK): NEGATIVE MG/DL
KETONES (URINE DIPSTICK): NEGATIVE MG/DL
LEUKOCYTES: NEGATIVE
MULTISTIX LOT#: NORMAL NUMERIC
NITRITE, URINE: NEGATIVE
OCCULT BLOOD: NEGATIVE
PH, URINE: 6 (ref 4.5–8)
PROTEIN (URINE DIPSTICK): NEGATIVE MG/DL
SPECIFIC GRAVITY: 1.01 (ref 1–1.03)
URINE-COLOR: YELLOW
UROBILINOGEN,SEMI-QN: 0.2 MG/DL (ref 0–1.9)

## 2024-11-15 PROCEDURE — 81003 URINALYSIS AUTO W/O SCOPE: CPT | Performed by: PHYSICIAN ASSISTANT

## 2024-11-15 PROCEDURE — 99212 OFFICE O/P EST SF 10 MIN: CPT | Performed by: PHYSICIAN ASSISTANT

## 2024-11-15 NOTE — TELEPHONE ENCOUNTER
Orders to   Edward             Pt aware to get labs done no sooner than 2 weeks prior to the appt.  Pt aware to fast.  No call back required.+    Future Appointments   Date Time Provider Department Center   5/23/2025  8:30 AM Schriedel, Adam, MD [facility]

## 2024-11-15 NOTE — PROGRESS NOTES
Subjective:   Geoff Matthews is a 59 year old AA male with a PMH of allergies, OA, herpes, who presents today for follow up today for incomplete emptying.    Patient seen approximately 3 weeks ago for sensation of incomplete emptying. Thought to be secondary to initiation of finasteride. PVR 287mL.We discussed options and through mutual decision making decided to stop finasteride and start Tamsulosin.    He is here today for PVR check and symptom assessment. UA negative and PVR 70mL. No evidence of ASE thus far. Overall happy with LUTS.      Previously see by Dr. Olivas in 2023 for elevated PSA. Had 4K completed that was 8.8% with repeat PSA 5.37. Prior pMRI 5/2021 that was PI RADS 2.    Prior PSAs:  - 5.37 1/4/24  - 7.22 12/16/23  - 4.85 4/25/23  - 4.02 4/12/22  - 5.41 5/5/21  - 3.89 10/12/20   - 3.51 4/8/19  - 5.36 3/12/19  - <2.5 prior to 2017    History/Other:    Chief Complaint Reviewed and Verified  Nursing Notes Reviewed and   Verified  Allergies Reviewed  Medications Reviewed         Current Outpatient Medications   Medication Sig Dispense Refill    tamsulosin 0.4 MG Oral Cap Take 1 capsule (0.4 mg total) by mouth daily. Take 1/2 hour following the same meal each day 30 capsule 3    propranolol 10 MG Oral Tab Take 1 tablet (10 mg total) by mouth daily as needed (for performance anxiety, to be taken 30 minuties before presentation). 30 tablet 0    Lidocaine-Hydrocortisone Ace 3-0.5 % External Cream Apply 1 Application topically daily. (Patient taking differently: Apply 1 Application topically as needed.) 1 each 0    clindamycin 1 % External Solution       Cetirizine HCl (ZYRTEC ALLERGY) 10 MG Oral Cap Take 1 capsule by mouth at bedtime.      Clindamycin Phosphate 1 % External Lotion       Alclometasone Dipropionate 0.05 % External Ointment       acyclovir 5 % External Ointment Apply 1 Application topically every 3 (three) hours. (Patient taking differently: Apply 1 Application  topically as needed.) 15 g 3     finasteride 5 MG Oral Tab Take 1 tablet (5 mg total) by mouth daily. (Patient not taking: Reported on 11/15/2024) 90 tablet 3       Review of Systems:  Pertinent items are noted in HPI.      Objective:   There were no vitals taken for this visit. Estimated body mass index is 25.55 kg/m² as calculated from the following:    Height as of 5/17/24: 6' 2\" (1.88 m).    Weight as of 5/17/24: 199 lb (90.3 kg).    No distress  Non labored respirations    Laboratory Data:  Lab Results   Component Value Date    WBC 4.4 05/10/2024    HGB 15.3 05/10/2024    .0 05/10/2024     Lab Results   Component Value Date     05/10/2024    K 4.4 05/10/2024     05/10/2024    CO2 27.0 05/10/2024    BUN 20 05/10/2024     (H) 05/10/2024    GFRAA 83 04/12/2022    AST 26 05/10/2024    ALT 27 05/10/2024    TP 7.2 05/10/2024    ALB 4.0 05/10/2024    CA 9.6 05/10/2024       Urinalysis Results (last three years):  Recent Labs     12/27/23  1404 10/21/24  0800 10/24/24  1227 11/15/24  1156   COLORUR  --  Light-Yellow  --   --    CLARITY  --  Clear  --   --    SPECGRAVITY 1.010 1.028 1.010 1.010   PHURINE 6.0 5.5 5.5 6.0   PROUR  --  Negative  --   --    GLUUR  --  Normal  --   --    KETUR  --  Negative  --   --    BILUR  --  Negative  --   --    BLOODURINE  --  Negative  --   --    NITRITE Negative Negative Negative Negative   UROBILINOGEN  --  Normal  --   --    LEUUR  --  Negative  --   --        Urine Culture Results (last three years):  Lab Results   Component Value Date    URINECUL No Growth at 18-24 hrs. 07/13/2018       Imaging  No results found.    Assessment & Plan:   BPH with LUTS  Incomplete emptying of bladder  Residual improved, happy with voiding habits at present  Recommend continuation of tamsulosin, could consider trial off in future if patient desires.    Elevated PSA  Repeat PSA in January 2025        Stephanie Stone PA-C, 11/15/2024

## 2025-01-23 PROBLEM — Z86.0101 PERSONAL HISTORY OF ADENOMATOUS AND SERRATED COLON POLYPS: Status: ACTIVE | Noted: 2025-01-23

## 2025-01-31 NOTE — PROGRESS NOTES
HPI:     Geoff Matthews is a 59 year old AA male with a PMH of allergies, OA, herpes.    Following for:  1. Elevated PSA  - pMRI 5/10/21: ~ 56 g, Pi2, mild BWT  2. BPH/LUTS  3. Urinary frequency/OAB    PCP - Schriedel  LOV 12/27/23    Presents for check-up.  Saw Stephanie 11/15/24 with HO which he attributed to proscar.   He is taking flomax. Stopped proscar after 1 week due to urinary urgency.  Exercises regularly, no longer using bike.     Reports worsening LUTS with certain foods like bananas and occasional urgency to defecate after orgasm.    AUA SS is 8/35 with 2 n, f; 1 s, w, I, HO. Was 10/35 with 3 f; 2 n; 1 s, w, u, I, HO. Mostly happy with LUTS. Thinks frequency is due to water/fluid intake. He declines meds for BPH or OAB at this time.  Incontinence: none, but close calls  ISRAEL LOV: > 40 g prostate, no nodules or tenderness    Prior PSAs:  - 6.63 5/10/24  - 5.37, 4K 8.8% 12/27/23  - 7.22 12/16/23  - 4.85 4/25/23  - 4.02 4/12/22  - 5.41 5/5/21  - 3.89 10/12/20   - 3.51 4/8/19  - 5.36 3/12/19  - <2.5 prior to 2017    He feels well. Energy is good and 100% potency.    Drinks ~  oz water per day and 12 oz coffee, 8 oz tea with light yellow urine.  UA is negative and PVR is 1 mL - was 22 mL LOV.    Discussed that urinary urgency would be an unlikely SE from finasteride. Discussed dutasteride as an option as well.    Discussed daily cialis as option for BPH/LUTS and he declines.    Have discussed Kegel exercises for urinary urgency and close calls getting to the bathroom. I provided and reviewed educational materials for this.    Will check PSA, observation for BPH/LUTS and urinary frequency. F/u in 6 mo with PSA and PVR.    Prior note:    Gross hematuria: none  Tobacco hx: none  Kidney stone hx: none  Fam h/o  malignancy: maternal uncle dx in late 60s      HISTORY:  Past Medical History:    Acute sinusitis, unspecified    Arthritis    Mainly knees    Bronchitis, not specified as acute or chronic     Cough    Dermatophytosis of nail    Disorder of prostate    Enlarged prostate    Herpes simplex without mention of complication    Personal history of pneumonia (recurrent)    URI (upper respiratory infection)    Wears glasses    Since childhood      Past Surgical History:   Procedure Laterality Date    Colonoscopy  3/22/16    Dr. Silva repeat 3 yrs    Dental surgery procedure      tooth implant      Family History   Problem Relation Age of Onset    Hypertension Mother     Lipids Mother     Other (hyperlipidemia) Mother     Colon Polyps Mother         Benign    Hypertension Father         Unrelated death in 1984    Other (HTN) Father     Hypertension Paternal Grandfather     Heart Attack Paternal Grandfather         Open heart surgery    Hypertension Sister         Recently put on medication due to weight      Social History:   Social History     Socioeconomic History    Marital status:    Tobacco Use    Smoking status: Never    Smokeless tobacco: Never   Vaping Use    Vaping status: Never Used   Substance and Sexual Activity    Alcohol use: Yes     Alcohol/week: 2.0 standard drinks of alcohol     Types: 2 Standard drinks or equivalent per week     Comment: 1-2 per week    Drug use: Never    Sexual activity: Yes   Other Topics Concern    Caffeine Concern Yes    Exercise Yes        Medications (Active prior to today's visit):  Current Outpatient Medications   Medication Sig Dispense Refill    tamsulosin 0.4 MG Oral Cap Take 1 capsule (0.4 mg total) by mouth daily. Take 1/2 hour following the same meal each day 90 capsule 5    Fluticasone Furoate 27.5 MCG/SPRAY Nasal Suspension by Nasal route daily.      Na Sulfate-K Sulfate-Mg Sulf (SUPREP BOWEL PREP KIT) 17.5-3.13-1.6 GM/177ML Oral Solution Take as directed by physician. 354 kit 0    propranolol 10 MG Oral Tab Take 1 tablet (10 mg total) by mouth daily as needed (for performance anxiety, to be taken 30 minuties before presentation). 30 tablet 0     Lidocaine-Hydrocortisone Ace 3-0.5 % External Cream Apply 1 Application topically daily. 1 each 0    clindamycin 1 % External Solution as needed.      Cetirizine HCl (ZYRTEC ALLERGY) 10 MG Oral Cap Take 1 capsule by mouth as needed.      Clindamycin Phosphate 1 % External Lotion as needed.      Alclometasone Dipropionate 0.05 % External Ointment as needed.      acyclovir 5 % External Ointment Apply 1 Application topically every 3 (three) hours. 15 g 3       Allergies:  Allergies   Allergen Reactions    Ciprofloxacin-Ciprofloxacin Hcl [Ciprofloxacin] NAUSEA ONLY and PAIN     Tabs    Seasonal UNKNOWN         ROS:     A comprehensive 10 point review of systems was completed.  Pertinent positives and negatives noted in the the HPI.    PHYSICAL EXAM:     GENERAL APPEARANCE: well, developed, well nourished, in no acute distress  NEUROLOGIC: nonfocal, alert and oriented  HEAD: normocephalic, atraumatic  EYES: sclera non-icteric  EARS: hearing intact  ORAL CAVITY: mucosa moist  NECK/THYROID: no obvious goiter or masses  LUNGS: nonlabored breathing  ABDOMEN: soft, no obvious masses or tenderness  SKIN: no obvious rashes    : as noted above     ASSESSMENT/PLAN:   Diagnoses and all orders for this visit:    BPH with obstruction/lower urinary tract symptoms  -     URINALYSIS, AUTO, W/O SCOPE  -     tamsulosin 0.4 MG Oral Cap; Take 1 capsule (0.4 mg total) by mouth daily. Take 1/2 hour following the same meal each day    Urinary retention    Elevated PSA  -     PSA Total, Diagnostic; Future  -     PSA Total, Diagnostic; Future    Urinary frequency    Incomplete emptying of bladder    - as noted above.    Thanks again for this consult.    Rigoberto Olivas MD, FACS  Urologist  [facility]  Office: [phone]

## 2025-02-03 ENCOUNTER — PATIENT MESSAGE (OUTPATIENT)
Dept: SURGERY | Facility: CLINIC | Age: 60
End: 2025-02-03

## 2025-02-03 ENCOUNTER — LAB ENCOUNTER (OUTPATIENT)
Dept: LAB | Age: 60
End: 2025-02-03
Attending: UROLOGY
Payer: COMMERCIAL

## 2025-02-03 RX ORDER — TAMSULOSIN HYDROCHLORIDE 0.4 MG/1
0.4 CAPSULE ORAL DAILY
Qty: 30 CAPSULE | Refills: 0 | Status: SHIPPED | OUTPATIENT
Start: 2025-02-03 | End: 2025-02-05

## 2025-02-04 RX ORDER — TAMSULOSIN HYDROCHLORIDE 0.4 MG/1
0.4 CAPSULE ORAL
Qty: 90 CAPSULE | Refills: 0 | OUTPATIENT
Start: 2025-02-04

## 2025-02-05 RX ORDER — TAMSULOSIN HYDROCHLORIDE 0.4 MG/1
0.4 CAPSULE ORAL DAILY
Qty: 90 CAPSULE | Refills: 0 | Status: SHIPPED | OUTPATIENT
Start: 2025-02-05 | End: 2025-05-06

## 2025-02-14 ENCOUNTER — OFFICE VISIT (OUTPATIENT)
Dept: SURGERY | Facility: CLINIC | Age: 60
End: 2025-02-14
Payer: COMMERCIAL

## 2025-02-14 ENCOUNTER — LAB ENCOUNTER (OUTPATIENT)
Dept: LAB | Age: 60
End: 2025-02-14
Attending: UROLOGY
Payer: COMMERCIAL

## 2025-02-14 DIAGNOSIS — R35.0 URINARY FREQUENCY: ICD-10-CM

## 2025-02-14 DIAGNOSIS — N40.1 BPH WITH OBSTRUCTION/LOWER URINARY TRACT SYMPTOMS: Primary | ICD-10-CM

## 2025-02-14 DIAGNOSIS — R97.20 ELEVATED PSA: ICD-10-CM

## 2025-02-14 DIAGNOSIS — N13.8 BPH WITH OBSTRUCTION/LOWER URINARY TRACT SYMPTOMS: Primary | ICD-10-CM

## 2025-02-14 DIAGNOSIS — R33.9 INCOMPLETE EMPTYING OF BLADDER: ICD-10-CM

## 2025-02-14 DIAGNOSIS — R33.9 URINARY RETENTION: ICD-10-CM

## 2025-02-14 LAB
APPEARANCE: CLEAR
BILIRUBIN: NEGATIVE
GLUCOSE (URINE DIPSTICK): NEGATIVE MG/DL
KETONES (URINE DIPSTICK): NEGATIVE MG/DL
LEUKOCYTES: NEGATIVE
MULTISTIX LOT#: NORMAL NUMERIC
NITRITE, URINE: NEGATIVE
OCCULT BLOOD: NEGATIVE
PH, URINE: 6.5 (ref 4.5–8)
PROTEIN (URINE DIPSTICK): NEGATIVE MG/DL
PSA SERPL-MCNC: 5.25 NG/ML (ref ?–4)
SPECIFIC GRAVITY: 1.01 (ref 1–1.03)
URINE-COLOR: YELLOW
UROBILINOGEN,SEMI-QN: 0.2 MG/DL (ref 0–1.9)

## 2025-02-14 PROCEDURE — 84153 ASSAY OF PSA TOTAL: CPT

## 2025-02-14 PROCEDURE — 99214 OFFICE O/P EST MOD 30 MIN: CPT | Performed by: UROLOGY

## 2025-02-14 PROCEDURE — 51798 US URINE CAPACITY MEASURE: CPT | Performed by: UROLOGY

## 2025-02-14 PROCEDURE — 81003 URINALYSIS AUTO W/O SCOPE: CPT | Performed by: UROLOGY

## 2025-02-14 PROCEDURE — 36415 COLL VENOUS BLD VENIPUNCTURE: CPT

## 2025-02-14 PROCEDURE — G2211 COMPLEX E/M VISIT ADD ON: HCPCS | Performed by: UROLOGY

## 2025-02-14 RX ORDER — TAMSULOSIN HYDROCHLORIDE 0.4 MG/1
0.4 CAPSULE ORAL DAILY
Qty: 90 CAPSULE | Refills: 5 | Status: SHIPPED | OUTPATIENT
Start: 2025-02-14

## 2025-03-19 DIAGNOSIS — N40.1 BPH WITH OBSTRUCTION/LOWER URINARY TRACT SYMPTOMS: ICD-10-CM

## 2025-03-19 DIAGNOSIS — N13.8 BPH WITH OBSTRUCTION/LOWER URINARY TRACT SYMPTOMS: ICD-10-CM

## 2025-03-20 RX ORDER — TAMSULOSIN HYDROCHLORIDE 0.4 MG/1
0.4 CAPSULE ORAL DAILY
Qty: 90 CAPSULE | Refills: 5 | OUTPATIENT
Start: 2025-03-20

## 2025-03-20 NOTE — TELEPHONE ENCOUNTER
LOV: 02/14/25  LF: 02/14/25 90 capsules with 5 refills   Refill request too soon   Refill denied per office protocol

## 2025-05-13 ENCOUNTER — LAB ENCOUNTER (OUTPATIENT)
Dept: LAB | Age: 60
End: 2025-05-13
Attending: INTERNAL MEDICINE
Payer: COMMERCIAL

## 2025-05-13 DIAGNOSIS — Z00.00 ROUTINE GENERAL MEDICAL EXAMINATION AT A HEALTH CARE FACILITY: ICD-10-CM

## 2025-05-13 DIAGNOSIS — Z13.220 SCREENING FOR LIPID DISORDERS: ICD-10-CM

## 2025-05-13 DIAGNOSIS — Z12.5 SCREENING FOR MALIGNANT NEOPLASM OF PROSTATE: ICD-10-CM

## 2025-05-13 DIAGNOSIS — Z13.29 SCREENING FOR THYROID DISORDER: ICD-10-CM

## 2025-05-13 DIAGNOSIS — Z13.228 SCREENING FOR METABOLIC DISORDER: ICD-10-CM

## 2025-05-13 DIAGNOSIS — Z13.0 SCREENING FOR DISORDER OF BLOOD AND BLOOD-FORMING ORGANS: ICD-10-CM

## 2025-05-13 LAB
ALBUMIN SERPL-MCNC: 4.7 G/DL (ref 3.2–4.8)
ALBUMIN/GLOB SERPL: 2 {RATIO} (ref 1–2)
ALP LIVER SERPL-CCNC: 59 U/L (ref 45–117)
ALT SERPL-CCNC: 18 U/L (ref 10–49)
ANION GAP SERPL CALC-SCNC: 11 MMOL/L (ref 0–18)
AST SERPL-CCNC: 32 U/L (ref ?–34)
BASOPHILS # BLD AUTO: 0.04 X10(3) UL (ref 0–0.2)
BASOPHILS NFR BLD AUTO: 1 %
BILIRUB SERPL-MCNC: 0.6 MG/DL (ref 0.3–1.2)
BUN BLD-MCNC: 22 MG/DL (ref 9–23)
CALCIUM BLD-MCNC: 9.5 MG/DL (ref 8.7–10.6)
CHLORIDE SERPL-SCNC: 107 MMOL/L (ref 98–112)
CHOLEST SERPL-MCNC: 195 MG/DL (ref ?–200)
CO2 SERPL-SCNC: 25 MMOL/L (ref 21–32)
COMPLEXED PSA SERPL-MCNC: 5.81 NG/ML (ref ?–4)
CREAT BLD-MCNC: 1.25 MG/DL (ref 0.7–1.3)
EGFRCR SERPLBLD CKD-EPI 2021: 66 ML/MIN/1.73M2 (ref 60–?)
EOSINOPHIL # BLD AUTO: 0.1 X10(3) UL (ref 0–0.7)
EOSINOPHIL NFR BLD AUTO: 2.5 %
ERYTHROCYTE [DISTWIDTH] IN BLOOD BY AUTOMATED COUNT: 12.4 %
FASTING PATIENT LIPID ANSWER: YES
FASTING STATUS PATIENT QL REPORTED: YES
GLOBULIN PLAS-MCNC: 2.4 G/DL (ref 2–3.5)
GLUCOSE BLD-MCNC: 99 MG/DL (ref 70–99)
HCT VFR BLD AUTO: 46.1 % (ref 39–53)
HDLC SERPL-MCNC: 58 MG/DL (ref 40–59)
HGB BLD-MCNC: 15.2 G/DL (ref 13–17.5)
IMM GRANULOCYTES # BLD AUTO: 0.01 X10(3) UL (ref 0–1)
IMM GRANULOCYTES NFR BLD: 0.2 %
LDLC SERPL CALC-MCNC: 122 MG/DL (ref ?–100)
LYMPHOCYTES # BLD AUTO: 2.05 X10(3) UL (ref 1–4)
LYMPHOCYTES NFR BLD AUTO: 50.2 %
MCH RBC QN AUTO: 27.5 PG (ref 26–34)
MCHC RBC AUTO-ENTMCNC: 33 G/DL (ref 31–37)
MCV RBC AUTO: 83.4 FL (ref 80–100)
MONOCYTES # BLD AUTO: 0.38 X10(3) UL (ref 0.1–1)
MONOCYTES NFR BLD AUTO: 9.3 %
NEUTROPHILS # BLD AUTO: 1.5 X10 (3) UL (ref 1.5–7.7)
NEUTROPHILS # BLD AUTO: 1.5 X10(3) UL (ref 1.5–7.7)
NEUTROPHILS NFR BLD AUTO: 36.8 %
NONHDLC SERPL-MCNC: 137 MG/DL (ref ?–130)
OSMOLALITY SERPL CALC.SUM OF ELEC: 299 MOSM/KG (ref 275–295)
PLATELET # BLD AUTO: 233 10(3)UL (ref 150–450)
POTASSIUM SERPL-SCNC: 4.1 MMOL/L (ref 3.5–5.1)
PROT SERPL-MCNC: 7.1 G/DL (ref 5.7–8.2)
RBC # BLD AUTO: 5.53 X10(6)UL (ref 4.3–5.7)
SODIUM SERPL-SCNC: 143 MMOL/L (ref 136–145)
TRIGL SERPL-MCNC: 85 MG/DL (ref 30–149)
TSI SER-ACNC: 2.23 UIU/ML (ref 0.55–4.78)
VLDLC SERPL CALC-MCNC: 15 MG/DL (ref 0–30)
WBC # BLD AUTO: 4.1 X10(3) UL (ref 4–11)

## 2025-05-13 PROCEDURE — 80061 LIPID PANEL: CPT

## 2025-05-13 PROCEDURE — 36415 COLL VENOUS BLD VENIPUNCTURE: CPT

## 2025-05-13 PROCEDURE — 84443 ASSAY THYROID STIM HORMONE: CPT

## 2025-05-13 PROCEDURE — 80053 COMPREHEN METABOLIC PANEL: CPT

## 2025-05-13 PROCEDURE — 85025 COMPLETE CBC W/AUTO DIFF WBC: CPT

## 2025-06-18 ENCOUNTER — PATIENT MESSAGE (OUTPATIENT)
Dept: SURGERY | Facility: CLINIC | Age: 60
End: 2025-06-18

## 2025-06-18 DIAGNOSIS — N13.8 BPH WITH OBSTRUCTION/LOWER URINARY TRACT SYMPTOMS: ICD-10-CM

## 2025-06-18 DIAGNOSIS — N40.1 BPH WITH OBSTRUCTION/LOWER URINARY TRACT SYMPTOMS: ICD-10-CM

## 2025-06-18 RX ORDER — TAMSULOSIN HYDROCHLORIDE 0.4 MG/1
0.4 CAPSULE ORAL DAILY
Qty: 90 CAPSULE | Refills: 5 | OUTPATIENT
Start: 2025-06-18

## 2025-06-18 NOTE — TELEPHONE ENCOUNTER
LOV: 02/14/25  Upcoming appt scheduled for 08/22/25  Refill request too soon-Rx sent on 02/14/25, 90 capsules with 5 refills  Refill denied per office protocol

## 2025-06-21 ENCOUNTER — HOSPITAL ENCOUNTER (OUTPATIENT)
Age: 60
Discharge: HOME | End: 2025-06-21
Payer: COMMERCIAL

## 2025-06-21 VITALS
DIASTOLIC BLOOD PRESSURE: 77 MMHG | HEART RATE: 73 BPM | RESPIRATION RATE: 20 BRPM | SYSTOLIC BLOOD PRESSURE: 128 MMHG | HEIGHT: 73 IN | BODY MASS INDEX: 25.58 KG/M2 | OXYGEN SATURATION: 97 % | TEMPERATURE: 98 F | WEIGHT: 193 LBS

## 2025-06-21 DIAGNOSIS — J06.9 VIRAL URI: Primary | ICD-10-CM

## 2025-06-21 PROCEDURE — 99212 OFFICE O/P EST SF 10 MIN: CPT

## 2025-06-21 PROCEDURE — 99203 OFFICE O/P NEW LOW 30 MIN: CPT

## 2025-06-21 NOTE — ED PROVIDER NOTES
Patient Seen in: [facility]       The following individual(s) verbally consented to be recorded using ambient AI listening technology and understand that they can each withdraw their consent to this listening technology at any point by asking the clinician to turn off or pause the recording:    Patient name: Geoff Matthews  Additional names:  n/a      History  Chief Complaint   Patient presents with    Cough/URI     Stated Complaint: Cough    Subjective:   HPI     Mr. Geoff Matthews is a 59 year old male who presents with throat discomfort and runny nose that began 24 hours ago.     He has experienced throat discomfort and congestion for 24 hours.  Initially, there was a change in his voice with some hoarseness and mild congestion. Coughed up some phlegm this AM, but not a ongoing cough.   Returned on international flight; exposed to many people.  Has not done any viral testing at home.   OTC's include Zyrtec.     Pt denies fever, chills, painful swallowing, headache, ear pain, wheezing, hemoptysis, shortness of breath, dyspnea on exertion, chest pain, nausea, vomiting or diarrhea.      Objective:     Past Medical History:    Acute sinusitis, unspecified    Arthritis    Mainly knees    Bronchitis, not specified as acute or chronic    Cough    Dermatophytosis of nail    Disorder of prostate    Enlarged prostate    Herpes simplex without mention of complication    Personal history of pneumonia (recurrent)    URI (upper respiratory infection)    Wears glasses    Since childhood              Past Surgical History:   Procedure Laterality Date    Colonoscopy  3/22/16    Dr. Silva repeat 3 yrs    Dental surgery procedure      tooth implant                Social History     Socioeconomic History    Marital status:    Tobacco Use    Smoking status: Never    Smokeless tobacco: Never   Vaping Use    Vaping status: Never Used   Substance and Sexual Activity    Alcohol use: Yes     Alcohol/week: 2.0  standard drinks of alcohol     Types: 2 Standard drinks or equivalent per week     Comment: 1-2 per week    Drug use: Never    Sexual activity: Yes   Other Topics Concern    Caffeine Concern Yes    Exercise Yes              Review of Systems    Positive for stated complaint: Cough  Other systems are as noted in HPI.  Constitutional and vital signs reviewed.      All other systems reviewed and negative except as noted above.          Physical Exam    ED Triage Vitals [06/21/25 1406]   /77   Pulse 73   Resp 20   Temp 98.1 °F (36.7 °C)   Temp src Oral   SpO2 97 %   O2 Device None (Room air)       Current Vitals:   Vital Signs  BP: 128/77  Pulse: 73  Resp: 20  Temp: 98.1 °F (36.7 °C)  Temp src: Oral    Oxygen Therapy  SpO2: 97 %  O2 Device: None (Room air)        Pertinent physical exam:      GENERAL: Alert, cooperative, well developed, no acute distress  HEENT: Normocephalic, normal oropharynx, moist mucous membranes, no cervical lymphadenopathy, ears normal  CHEST: Clear to auscultation bilaterally, no wheezes, rhonchi, or crackles  CARDIOVASCULAR: Normal heart rate and rhythm, S1 and S2 normal without murmurs  EXTREMITIES: No edema  NEUROLOGICAL: Cranial nerves grossly intact, moves all extremities without gross motor or sensory deficit       Physical Exam     ED Course  Labs Reviewed - No data to display   MDM     Assessment & Plan  Viral illness  Symptoms suggest viral etiology, differential includes allergic rhinitis, Covid, other viral illness.   On exam, pt is well appearing with normal vitals, lungs clear bilaterally, no peripheral edema.   - Pt prefers to perform home COVID test.  - Recommend nasal saline, Flonase, Continue Zyrtec, push fluids.   - Take ibuprofen if sore throat develops.   - Follow up with PCP or RTC if symptoms worsen.  - Pt agreeable to plan.       Medical Decision Making      Disposition and Plan     Clinical Impression:  1. Viral URI         Disposition:  Discharge  6/21/2025  2:26  pm    Follow-up:  Schriedel, Adam, MD  [address] [address]  [phone]      If symptoms worsen          Medications Prescribed:  Current Discharge Medication List                Supplementary Documentation:

## 2025-07-01 ENCOUNTER — OFFICE VISIT (OUTPATIENT)
Dept: INTERNAL MEDICINE CLINIC | Facility: CLINIC | Age: 60
End: 2025-07-01
Payer: COMMERCIAL

## 2025-07-01 VITALS
TEMPERATURE: 97 F | BODY MASS INDEX: 24.77 KG/M2 | HEIGHT: 74 IN | RESPIRATION RATE: 18 BRPM | OXYGEN SATURATION: 94 % | DIASTOLIC BLOOD PRESSURE: 78 MMHG | SYSTOLIC BLOOD PRESSURE: 118 MMHG | HEART RATE: 84 BPM | WEIGHT: 193 LBS

## 2025-07-01 DIAGNOSIS — J30.2 SEASONAL ALLERGIES: ICD-10-CM

## 2025-07-01 DIAGNOSIS — J01.00 ACUTE NON-RECURRENT MAXILLARY SINUSITIS: Primary | ICD-10-CM

## 2025-07-01 PROCEDURE — 3008F BODY MASS INDEX DOCD: CPT

## 2025-07-01 PROCEDURE — G2211 COMPLEX E/M VISIT ADD ON: HCPCS

## 2025-07-01 PROCEDURE — 99213 OFFICE O/P EST LOW 20 MIN: CPT

## 2025-07-01 PROCEDURE — 3074F SYST BP LT 130 MM HG: CPT

## 2025-07-01 PROCEDURE — 3078F DIAST BP <80 MM HG: CPT

## 2025-07-01 RX ORDER — FLUTICASONE PROPIONATE 50 MCG
2 SPRAY, SUSPENSION (ML) NASAL DAILY
Qty: 1 EACH | Refills: 3 | Status: SHIPPED | OUTPATIENT
Start: 2025-07-01 | End: 2026-06-26

## 2025-07-01 NOTE — PROGRESS NOTES
Geoff Matthews is a 59 year old male.   Chief Complaint   Patient presents with    Chest Congestion     KK Rm 14 - Chest congestion.  Slight coughing up green x2 weeks.       HPI:      History of Present Illness  Mr. Geoff Matthews is a 59 year old male who presents with chest congestion and difficulty swallowing.    He has experienced chest congestion for nearly two weeks, initially noticed on a Thursday. The congestion is persistent, with no associated fever or other symptoms. He initially experienced a sensation of choking when swallowing and mild breathing difficulties, prompting a visit to urgent care. He has been using Flonase, Zyrtec, and initially Mucinex, which he later switched to Robitussin DM. The congestion is less bothersome during the day but worsens at night when lying down, leading to increased coughing. The cough produces variable secretions, ranging from dense to 'big globs of green'.    He reports a groggy voice, which was lost early on but has since partially returned. No nausea, ear pain, hearing changes, sore throat, or headaches. He experiences a single significant cough in the morning to clear his throat. Despite the symptoms, he continues to exercise regularly without feeling winded or experiencing any limitations.    He mentions soreness in the chest area, which he can reproduce by touching the area. No dizziness, balance issues, or head pressure. His current medications include Flonase and Zyrtec, with a request for a Flonase refill.       Allergies:  Allergies[1]   Current Meds:  Current Medications[2]     PMH:   Past Medical History[3]    ROS:   Review of Systems   Constitutional:  Negative for activity change, appetite change and fatigue.   HENT:  Positive for congestion, postnasal drip and voice change.    Respiratory:  Positive for cough. Negative for shortness of breath and wheezing.    Cardiovascular:  Negative for chest pain and palpitations.   Neurological:  Negative for dizziness,  light-headedness and headaches.        PHYSICAL EXAM:    /78 (BP Location: Right arm, Patient Position: Sitting, Cuff Size: adult)   Pulse 84   Temp 97.4 °F (36.3 °C) (Temporal)   Resp 18   Ht 6' 2\" (1.88 m)   Wt 193 lb (87.5 kg)   SpO2 94%   BMI 24.78 kg/m²   Physical Exam  Constitutional:       Appearance: Normal appearance.   HENT:      Nose:      Right Turbinates: Swollen.      Left Turbinates: Swollen.   Cardiovascular:      Rate and Rhythm: Normal rate.   Skin:     General: Skin is warm and dry.   Neurological:      Mental Status: He is alert.          ASSESSMENT/ PLAN:     Assessment & Plan  Sinusitis  - Prescribed Augmentin 875 mg BID for 10 days. Informed about potential GI upset and diarrhea. Suggested probiotics or yogurt. Advised monitoring for severe diarrhea.  - Continue Flonase, two sprays per nostril daily. Refilled prescription.  - Recommend sinus rinses with distilled, bottled, or filtered water.  - Continue Zyrtec as needed.  - Advised to report worsening symptoms.  - Conditional chest X-ray order if symptoms worsen.       Health Maintenance Due   Topic Date Due    Pneumococcal Vaccine: 50+ Years (2 of 2 - PCV) 09/17/2015    Annual Depression Screening  01/01/2025    Annual Physical  05/17/2025            The following individual(s) verbally consented to be recorded using ambient AI listening technology and understand that they can each withdraw their consent to this listening technology at any point by asking the clinician to turn off or pause the recording:    Patient name: Geoff Matthews    Pt indicates understanding and agrees to the plan.     Follow up as needed    JOAQUÍN Crabtree          [1]   Allergies  Allergen Reactions    Ciprofloxacin-Ciprofloxacin Hcl [Ciprofloxacin] NAUSEA ONLY and PAIN     Tabs    Seasonal UNKNOWN   [2]   Current Outpatient Medications   Medication Sig Dispense Refill    tamsulosin 0.4 MG Oral Cap Take 1 capsule (0.4 mg total) by mouth daily. Take  1/2 hour following the same meal each day 90 capsule 5    Fluticasone Furoate 27.5 MCG/SPRAY Nasal Suspension by Nasal route daily.      Na Sulfate-K Sulfate-Mg Sulf (SUPREP BOWEL PREP KIT) 17.5-3.13-1.6 GM/177ML Oral Solution Take as directed by physician. 354 kit 0    propranolol 10 MG Oral Tab Take 1 tablet (10 mg total) by mouth daily as needed (for performance anxiety, to be taken 30 minuties before presentation). 30 tablet 0    Lidocaine-Hydrocortisone Ace 3-0.5 % External Cream Apply 1 Application topically daily. 1 each 0    clindamycin 1 % External Solution as needed.      Cetirizine HCl (ZYRTEC ALLERGY) 10 MG Oral Cap Take 1 capsule by mouth as needed.      Clindamycin Phosphate 1 % External Lotion as needed.      Alclometasone Dipropionate 0.05 % External Ointment as needed.      acyclovir 5 % External Ointment Apply 1 Application topically every 3 (three) hours. 15 g 3   [3]   Past Medical History:   Acute sinusitis, unspecified    Arthritis    Mainly knees    Bronchitis, not specified as acute or chronic    Cough    Dermatophytosis of nail    Disorder of prostate    Enlarged prostate    Herpes simplex without mention of complication    Personal history of pneumonia (recurrent)    URI (upper respiratory infection)    Wears glasses    Since childhood

## 2025-07-18 ENCOUNTER — OFFICE VISIT (OUTPATIENT)
Dept: INTERNAL MEDICINE CLINIC | Facility: CLINIC | Age: 60
End: 2025-07-18
Payer: COMMERCIAL

## 2025-07-18 VITALS
DIASTOLIC BLOOD PRESSURE: 60 MMHG | OXYGEN SATURATION: 97 % | HEART RATE: 98 BPM | WEIGHT: 198 LBS | SYSTOLIC BLOOD PRESSURE: 110 MMHG | BODY MASS INDEX: 26.24 KG/M2 | RESPIRATION RATE: 16 BRPM | TEMPERATURE: 97 F | HEIGHT: 73 IN

## 2025-07-18 DIAGNOSIS — Z00.00 PE (PHYSICAL EXAM), ANNUAL: Primary | ICD-10-CM

## 2025-07-18 DIAGNOSIS — N40.0 BENIGN PROSTATIC HYPERPLASIA WITHOUT LOWER URINARY TRACT SYMPTOMS: ICD-10-CM

## 2025-07-18 DIAGNOSIS — R21 RASH: ICD-10-CM

## 2025-07-18 PROBLEM — K64.5 THROMBOSED HEMORRHOIDS: Status: ACTIVE | Noted: 2025-07-18

## 2025-07-18 RX ORDER — CLOTRIMAZOLE AND BETAMETHASONE DIPROPIONATE 10; .64 MG/G; MG/G
1 CREAM TOPICAL 2 TIMES DAILY PRN
Qty: 30 G | Refills: 0 | Status: SHIPPED | OUTPATIENT
Start: 2025-07-18

## 2025-07-18 NOTE — PROGRESS NOTES
The following individual(s) verbally consented to be recorded using ambient AI listening technology and understand that they can each withdraw their consent to this listening technology at any point by asking the clinician to turn off or pause the recording:    Patient name: Geoff Matthews    Subjective:   Geoff Matthews is a 59 year old male who presents for Physical (Rm 10 SS/)     History/Other:   History of Present Illness  Mr. Geoff Matthews is a 59 year old male who presents for an annual physical exam.    He has stable PSA levels, with a recent value of 5.8, down from 5.25 previously. He underwent a colonoscopy in January and an MRI in 2021. He had a 4K score test as part of his prostate cancer workup. He is concerned about the potential for prostate cancer.    He is currently using Flomax for urinary symptoms, which has improved his urine flow. He previously tried finasteride but experienced adverse effects, including a constant sensation of needing to urinate, which he found intolerable. He is not currently using finasteride due to these side effects.    He uses a hydrocortisone ointment for minor skin irritations and contact dermatitis, particularly on his eyelids. He mentions having had hemorrhoids in the past but is not currently using hemorrhoid cream.    No recent fevers, chills, cough, chest pain, shortness of breath, abdominal pain, nausea, vomiting, or diarrhea. He notes occasional arthritis flare-ups in his hands, which are transient and manageable. He has switched from running to walking to reduce joint impact and maintain physical activity.    He maintains a low-carb diet and exercises five to seven days a week. No significant vision or hearing changes, although he uses reading glasses for small print. He is adjusting to the idea of turning 60 and is focused on maintaining his health and flexibility.   Chief Complaint Reviewed and Verified  Nursing Notes Reviewed and   Verified  Tobacco  Reviewed  Allergies Reviewed  Medications Reviewed    Problem List Reviewed  Medical History Reviewed  Surgical History   Reviewed  Family History Reviewed  Social History Reviewed         Tobacco:  He has never smoked tobacco.    Current Medications[1]      Review of Systems:  Review of Systems  No f/c/chest pain or sob. No cough. No n/v/d. No ha or dizziness. No numbness, tingling, or weakness. No other complaints today.      Objective:   /60   Pulse 98   Temp 96.7 °F (35.9 °C) (Temporal)   Resp 16   Ht 6' 1\" (1.854 m)   Wt 198 lb (89.8 kg)   SpO2 97%   BMI 26.12 kg/m²  Estimated body mass index is 26.12 kg/m² as calculated from the following:    Height as of this encounter: 6' 1\" (1.854 m).    Weight as of this encounter: 198 lb (89.8 kg).  Physical Exam  Results  LABS  PSA: 5.8 (05/2023)  /60   Pulse 98   Temp 96.7 °F (35.9 °C) (Temporal)   Resp 16   Ht 6' 1\" (1.854 m)   Wt 198 lb (89.8 kg)   SpO2 97%   BMI 26.12 kg/m²   Constitutional: Oriented to person, place, and time. No distress.   HEENT:  Normocephalic and atraumatic. Hearing and tympanic membranes normal.  Nose normal. Oropharynx is clear and moist.   Eyes: Conjunctivae and EOM are normal. PERRLA. No scleral icterus.   Neck: Normal range of motion. Neck supple. Normal carotid pulses and no JVD present. No edema present. No mass and no thyromegaly present.   Cardiovascular: Normal rate, regular rhythm and intact distal pulses.  No murmur, rubs or gallops.   Pulmonary/Chest: Effort normal and breath sounds normal. No respiratory distress. No wheezes, rhonchi or rales  Abdominal: Soft. Bowel sounds are normal. Non tender, no masses, no organomegaly or hernias.  Musculoskeletal: Normal range of motion. No edema and no tenderness.  No effusions.  Lymphadenopathy: No cervical adenopathy.   Neurological: Normal reflexes. No cranial nerve deficit or sensory deficit. Normal muscle tone. Coordination normal.   Skin: Skin is warm  and dry. No rash noted. No erythema. No pallor.   Psychiatric: Normal mood and affect.       Assessment & Plan:   1. PE (physical exam), annual (Primary)  2. Benign prostatic hyperplasia without lower urinary tract symptoms  3. Rash  -     Clotrimazole-Betamethasone; Apply 1 Application  topically 2 (two) times daily as needed.  Dispense: 30 g; Refill: 0  Other orders  -     Prevnar 20 (PCV20) [22237]    Assessment & Plan  Benign Prostatic Hyperplasia (BPH)  PSA levels are well-managed, with a recent value of 5.8, slightly decreased from 5.25. Currently on tamsulosin, which has improved urine flow with tolerable side effects, including a slight change in libido. Previously tried finasteride but experienced intolerable adverse effects, including persistent urgency. Under surveillance for prostate cancer, with previous MRI and 4K score tests indicating a low risk of malignancy.  - Continue tamsulosin for BPH management.  - Monitor PSA levels regularly.  - Consider alternative alpha-blockers if needed.  - Continue surveillance for prostate cancer.    Contact Dermatitis  Experiences occasional contact dermatitis, particularly on eyelids, managed with alclometasone ointment. Also experiences dry, pruritic patches on skin, treated with the ointment.  - Refill alclometasone ointment prescription.    General Health Maintenance  Exercises regularly, maintains a low-carb diet, and is proactive in managing health. Blood pressure and cholesterol levels are well-controlled. Discussed the new recommendation for pneumonia vaccination starting at age 50 due to increased prevalence. Prevnar 20 vaccine covers the 20 most common strains of bacterial pneumonia. Future need for a booster at age 65 is uncertain.  - Administer Prevnar 20 vaccine for pneumonia prevention.  - Plan for a repeat heart scan in 2027.  - Encourage continued regular exercise and a healthy diet.      No follow-ups on file.      Adam Schriedel, MD, 7/18/2025, 9:18  AM                [1]   Current Outpatient Medications   Medication Sig Dispense Refill    clotrimazole-betamethasone 1-0.05 % External Cream Apply 1 Application  topically 2 (two) times daily as needed. 30 g 0    fluticasone propionate 50 MCG/ACT Nasal Suspension 2 sprays by Each Nare route daily. 1 each 3    tamsulosin 0.4 MG Oral Cap Take 1 capsule (0.4 mg total) by mouth daily. Take 1/2 hour following the same meal each day 90 capsule 5    Lidocaine-Hydrocortisone Ace 3-0.5 % External Cream Apply 1 Application topically daily. 1 each 0    clindamycin 1 % External Solution as needed.      Cetirizine HCl (ZYRTEC ALLERGY) 10 MG Oral Cap Take 1 capsule by mouth as needed.      Clindamycin Phosphate 1 % External Lotion as needed.      Alclometasone Dipropionate 0.05 % External Ointment as needed.      acyclovir 5 % External Ointment Apply 1 Application topically every 3 (three) hours. 15 g 3    Fluticasone Furoate 27.5 MCG/SPRAY Nasal Suspension by Nasal route daily. (Patient not taking: Reported on 7/18/2025)      propranolol 10 MG Oral Tab Take 1 tablet (10 mg total) by mouth daily as needed (for performance anxiety, to be taken 30 minuties before presentation). (Patient not taking: Reported on 7/18/2025) 30 tablet 0

## (undated) DIAGNOSIS — Z13.220 SCREENING FOR LIPID DISORDERS: ICD-10-CM

## (undated) DIAGNOSIS — Z13.228 SCREENING FOR ENDOCRINE, NUTRITIONAL, METABOLIC AND IMMUNITY DISORDER: ICD-10-CM

## (undated) DIAGNOSIS — Z13.0 SCREENING FOR ENDOCRINE, NUTRITIONAL, METABOLIC AND IMMUNITY DISORDER: ICD-10-CM

## (undated) DIAGNOSIS — Z13.29 SCREENING FOR THYROID DISORDER: ICD-10-CM

## (undated) DIAGNOSIS — R97.20 ELEVATED PSA: Primary | ICD-10-CM

## (undated) DIAGNOSIS — Z13.29 SCREENING FOR ENDOCRINE, NUTRITIONAL, METABOLIC AND IMMUNITY DISORDER: ICD-10-CM

## (undated) DIAGNOSIS — Z13.21 SCREENING FOR ENDOCRINE, NUTRITIONAL, METABOLIC AND IMMUNITY DISORDER: ICD-10-CM

## (undated) DIAGNOSIS — Z13.1 SCREENING FOR DIABETES MELLITUS (DM): ICD-10-CM

## (undated) DIAGNOSIS — Z00.00 LABORATORY EXAM ORDERED AS PART OF ROUTINE GENERAL MEDICAL EXAMINATION: ICD-10-CM

## (undated) DEVICE — COVER LIGHT HANDLE RIGID GREEN

## (undated) DEVICE — MEGADYNE E-Z CLEAN NDLE 2.5IN

## (undated) DEVICE — STERILE POLYISOPRENE POWDER-FREE SURGICAL GLOVES: Brand: PROTEXIS

## (undated) DEVICE — TUBING IRR 16FT CNT WV 3 ASCP

## (undated) DEVICE — CLOSURE EXOFIN 1.0ML

## (undated) DEVICE — GOWN,SIRUS,FABRIC-REINFORCED,X-LARGE: Brand: MEDLINE

## (undated) DEVICE — 3M™ STERI-STRIP™ REINFORCED ADHESIVE SKIN CLOSURES, R1547, 1/2 IN X 4 IN (12 MM X 100 MM), 6 STRIPS/ENVELOPE: Brand: 3M™ STERI-STRIP™

## (undated) DEVICE — STOCK SUR W9XL48IN IMPERV

## (undated) DEVICE — COVER,MAYO STAND,STERILE: Brand: MEDLINE

## (undated) DEVICE — 1000 S-DRAPE TOWEL DRAPE 10/BX: Brand: STERI-DRAPE™

## (undated) DEVICE — SLEEVE KENDALL SCD EXPRESS MED

## (undated) DEVICE — DUAL SPIKE ADAPTER: Brand: CONMED

## (undated) DEVICE — SUT MONOCRYL 3-0 PS-2 Y427H

## (undated) DEVICE — 3M™ STERI-DRAPE™ INSTRUMENT POUCH 1018: Brand: STERI-DRAPE™

## (undated) DEVICE — SPNG GZ W4XL4IN COT 12 PLY TYP

## (undated) DEVICE — PAD SACRAL PREMIUM 12X12X1

## (undated) DEVICE — 3M™ IOBAN™ 2 ANTIMICROBIAL INCISE DRAPE 6648EZ: Brand: IOBAN™ 2

## (undated) DEVICE — 3M™ TEGADERM™ +PAD FILM DRESSING WITH NON-ADHERENT PAD, 3584, 2-3/8 IN X 4 IN (6 CM X 10 CM), 50/CAR, 4 CAR/CS: Brand: 3M™ TEGADERM™

## (undated) DEVICE — #15 STERILE STAINLESS BLADE: Brand: STERILE STAINLESS BLADES

## (undated) DEVICE — KIT TRC TRIMANO BEACH CHR ARM

## (undated) DEVICE — SUT MONOCRYL 2-0 SH Y317H

## (undated) DEVICE — ISOPROPYL ALCOHOL 70% 4OZ BTL

## (undated) DEVICE — SHOULDER ARTHROSCOPY CDS-LF: Brand: MEDLINE INDUSTRIES, INC.

## (undated) DEVICE — LIGHT HANDLE

## (undated) DEVICE — 3M™ TEGADERM™ +PAD FILM DRESSING WITH NON-ADHERENT PAD, 3587, 3-1/2 IN X 4-1/8 IN (9 CM X 10.5 CM), 25/CAR, 4 CAR/CS: Brand: 3M™ TEGADERM™

## (undated) DEVICE — DRAPE,U/SHT,SPLIT,FILM,60X84,STERILE: Brand: MEDLINE

## (undated) DEVICE — SOLUTION  .9 3000ML

## (undated) DEVICE — #10 STERILE BLADE: Brand: POLYMER COATED BLADES

## (undated) DEVICE — MEDI-VAC SUCTION HANDLE REGULAR CAPACITY: Brand: CARDINAL HEALTH

## (undated) DEVICE — TUBING DW OUTFLOW

## (undated) DEVICE — STERILE POLYISOPRENE POWDER-FREE SURGICAL GLOVES WITH EMOLLIENT COATING: Brand: PROTEXIS

## (undated) DEVICE — SHEET,DRAPE,70X100,STERILE: Brand: MEDLINE

## (undated) DEVICE — PENCIL TELESCOPE MEGADYNE SE

## (undated) NOTE — LETTER
Patient Name: Anna Barrett  YOB: 1965          MRN number:  [id number]  Date:  9/29/2021  Referring Physician:  Marja Alpers    Discharge Summary  Initial Functional Outcome Score 70/100  Final Functional Outcome Score 88/100  Number of Visits

## (undated) NOTE — LETTER
07/01/19        [address] [id number]      Dear Emliy Puentes,    Our records indicate that you have outstanding lab work and or testing that was ordered for you and has not yet been completed:  Orders Placed This Encounter

## (undated) NOTE — LETTER
06/01/19        Geoff [address] [phone]      Dear Daija Bethea,    Our records indicate that you have outstanding lab work and or testing that was ordered for you and has not yet been completed:  Orders Placed This Encounter

## (undated) NOTE — LETTER
Date: 10/10/2022    Patient Name: Nayana Navarro          To Whom it may concern: This letter has been written at the patient's request. The above patient was seen at one of the [facility] locations for treatment of a medical condition. The patient may be excused from work related travel due to shoulder surgery planned for 10/27/22. This restriction will remain in place for 6 weeks following surgery. Sincerely,        Jarvis Henry. Roxi Mayen MD  Knee, Shoulder, & Elbow Surgery / Sports Medicine Specialist  [facility]  [place] [address], [facility], [address] [place] [address]. [email or web address]  t: [phone]  o: [phone]  f: [phone]